# Patient Record
Sex: MALE | Race: OTHER | NOT HISPANIC OR LATINO | URBAN - METROPOLITAN AREA
[De-identification: names, ages, dates, MRNs, and addresses within clinical notes are randomized per-mention and may not be internally consistent; named-entity substitution may affect disease eponyms.]

---

## 2019-09-15 ENCOUNTER — INPATIENT (INPATIENT)
Facility: HOSPITAL | Age: 74
LOS: 6 days | Discharge: HOME | DRG: 853 | End: 2019-09-22
Attending: SURGERY | Admitting: SURGERY
Payer: MEDICARE

## 2019-09-15 VITALS
HEART RATE: 102 BPM | SYSTOLIC BLOOD PRESSURE: 107 MMHG | DIASTOLIC BLOOD PRESSURE: 64 MMHG | RESPIRATION RATE: 20 BRPM | OXYGEN SATURATION: 98 % | TEMPERATURE: 98 F | WEIGHT: 169.98 LBS

## 2019-09-15 LAB
ALBUMIN SERPL ELPH-MCNC: 2.5 G/DL — LOW (ref 3.4–5)
ALP SERPL-CCNC: 115 U/L — SIGNIFICANT CHANGE UP (ref 40–120)
ALT FLD-CCNC: 84 U/L — HIGH (ref 12–42)
ANION GAP SERPL CALC-SCNC: 14 MMOL/L — SIGNIFICANT CHANGE UP (ref 9–16)
ANION GAP SERPL CALC-SCNC: 7 MMOL/L — LOW (ref 9–16)
APTT BLD: 28 SEC — SIGNIFICANT CHANGE UP (ref 27.5–36.3)
AST SERPL-CCNC: 55 U/L — HIGH (ref 15–37)
BASOPHILS NFR BLD AUTO: 0.5 % — SIGNIFICANT CHANGE UP (ref 0–2)
BILIRUB SERPL-MCNC: 0.6 MG/DL — SIGNIFICANT CHANGE UP (ref 0.2–1.2)
BUN SERPL-MCNC: 40 MG/DL — HIGH (ref 7–23)
BUN SERPL-MCNC: 41 MG/DL — HIGH (ref 7–23)
CALCIUM SERPL-MCNC: 7.8 MG/DL — LOW (ref 8.5–10.5)
CALCIUM SERPL-MCNC: 9.6 MG/DL — SIGNIFICANT CHANGE UP (ref 8.5–10.5)
CHLORIDE SERPL-SCNC: 100 MMOL/L — SIGNIFICANT CHANGE UP (ref 96–108)
CHLORIDE SERPL-SCNC: 108 MMOL/L — SIGNIFICANT CHANGE UP (ref 96–108)
CO2 SERPL-SCNC: 24 MMOL/L — SIGNIFICANT CHANGE UP (ref 22–31)
CO2 SERPL-SCNC: 25 MMOL/L — SIGNIFICANT CHANGE UP (ref 22–31)
CREAT SERPL-MCNC: 1.77 MG/DL — HIGH (ref 0.5–1.3)
CREAT SERPL-MCNC: 1.86 MG/DL — HIGH (ref 0.5–1.3)
EOSINOPHIL NFR BLD AUTO: 0.1 % — SIGNIFICANT CHANGE UP (ref 0–6)
GLUCOSE SERPL-MCNC: 116 MG/DL — HIGH (ref 70–99)
GLUCOSE SERPL-MCNC: 143 MG/DL — HIGH (ref 70–99)
HCT VFR BLD CALC: 44.3 % — SIGNIFICANT CHANGE UP (ref 39–50)
HGB BLD-MCNC: 14.8 G/DL — SIGNIFICANT CHANGE UP (ref 13–17)
IMM GRANULOCYTES NFR BLD AUTO: 1 % — SIGNIFICANT CHANGE UP (ref 0–1.5)
INR BLD: 1.6 — HIGH (ref 0.88–1.16)
LACTATE SERPL-SCNC: 3.7 MMOL/L — HIGH (ref 0.4–2)
LYMPHOCYTES # BLD AUTO: 3.4 % — LOW (ref 13–44)
MCHC RBC-ENTMCNC: 29.1 PG — SIGNIFICANT CHANGE UP (ref 27–34)
MCHC RBC-ENTMCNC: 33.4 G/DL — SIGNIFICANT CHANGE UP (ref 32–36)
MCV RBC AUTO: 87.2 FL — SIGNIFICANT CHANGE UP (ref 80–100)
MONOCYTES NFR BLD AUTO: 5 % — SIGNIFICANT CHANGE UP (ref 2–14)
NEUTROPHILS NFR BLD AUTO: 90 % — HIGH (ref 43–77)
PLATELET # BLD AUTO: 354 K/UL — SIGNIFICANT CHANGE UP (ref 150–400)
POTASSIUM SERPL-MCNC: 4.3 MMOL/L — SIGNIFICANT CHANGE UP (ref 3.5–5.3)
POTASSIUM SERPL-MCNC: 4.9 MMOL/L — SIGNIFICANT CHANGE UP (ref 3.5–5.3)
POTASSIUM SERPL-SCNC: 4.3 MMOL/L — SIGNIFICANT CHANGE UP (ref 3.5–5.3)
POTASSIUM SERPL-SCNC: 4.9 MMOL/L — SIGNIFICANT CHANGE UP (ref 3.5–5.3)
PROT SERPL-MCNC: 7 G/DL — SIGNIFICANT CHANGE UP (ref 6.4–8.2)
PROTHROM AB SERPL-ACNC: 17.9 SEC — HIGH (ref 10–12.9)
RBC # BLD: 5.08 M/UL — SIGNIFICANT CHANGE UP (ref 4.2–5.8)
RBC # FLD: 12.8 % — SIGNIFICANT CHANGE UP (ref 10.3–14.5)
SODIUM SERPL-SCNC: 138 MMOL/L — SIGNIFICANT CHANGE UP (ref 132–145)
SODIUM SERPL-SCNC: 140 MMOL/L — SIGNIFICANT CHANGE UP (ref 132–145)
WBC # BLD: 20 K/UL — HIGH (ref 3.8–10.5)
WBC # FLD AUTO: 20 K/UL — HIGH (ref 3.8–10.5)

## 2019-09-15 PROCEDURE — 99285 EMERGENCY DEPT VISIT HI MDM: CPT

## 2019-09-15 PROCEDURE — 72193 CT PELVIS W/DYE: CPT | Mod: 26

## 2019-09-15 RX ORDER — SODIUM CHLORIDE 9 MG/ML
2400 INJECTION INTRAMUSCULAR; INTRAVENOUS; SUBCUTANEOUS ONCE
Refills: 0 | Status: COMPLETED | OUTPATIENT
Start: 2019-09-15 | End: 2019-09-15

## 2019-09-15 RX ORDER — HYDROMORPHONE HYDROCHLORIDE 2 MG/ML
1 INJECTION INTRAMUSCULAR; INTRAVENOUS; SUBCUTANEOUS ONCE
Refills: 0 | Status: DISCONTINUED | OUTPATIENT
Start: 2019-09-15 | End: 2019-09-15

## 2019-09-15 RX ORDER — MORPHINE SULFATE 50 MG/1
4 CAPSULE, EXTENDED RELEASE ORAL ONCE
Refills: 0 | Status: DISCONTINUED | OUTPATIENT
Start: 2019-09-15 | End: 2019-09-15

## 2019-09-15 RX ADMIN — HYDROMORPHONE HYDROCHLORIDE 1 MILLIGRAM(S): 2 INJECTION INTRAMUSCULAR; INTRAVENOUS; SUBCUTANEOUS at 20:52

## 2019-09-15 RX ADMIN — MORPHINE SULFATE 4 MILLIGRAM(S): 50 CAPSULE, EXTENDED RELEASE ORAL at 19:39

## 2019-09-15 RX ADMIN — Medication 600 MILLIGRAM(S): at 21:41

## 2019-09-15 RX ADMIN — MORPHINE SULFATE 4 MILLIGRAM(S): 50 CAPSULE, EXTENDED RELEASE ORAL at 20:10

## 2019-09-15 RX ADMIN — SODIUM CHLORIDE 2400 MILLILITER(S): 9 INJECTION INTRAMUSCULAR; INTRAVENOUS; SUBCUTANEOUS at 21:41

## 2019-09-15 RX ADMIN — Medication 100 MILLIGRAM(S): at 19:57

## 2019-09-15 RX ADMIN — SODIUM CHLORIDE 2400 MILLILITER(S): 9 INJECTION INTRAMUSCULAR; INTRAVENOUS; SUBCUTANEOUS at 22:22

## 2019-09-15 NOTE — ED ADULT TRIAGE NOTE - CHIEF COMPLAINT QUOTE
c/o pain to anus- states nicky mathew prescribed him bactrim c/o pain to anus- states nicky mathew prescribed him bactrim- reports no improvement with pain

## 2019-09-15 NOTE — ED PROVIDER NOTE - CONSTITUTIONAL, MLM
normal... Well appearing, well nourished, awake, alert, oriented to person, place, time/situation and in no apparent distress. Well appearing, well nourished, and in no apparent distress.

## 2019-09-15 NOTE — ED PROVIDER NOTE - CLINICAL SUMMARY MEDICAL DECISION MAKING FREE TEXT BOX
pt remained stable throughout ed course  pain controlled admit to surgical service for operative intervention of abscess pt remained stable throughout ed course  case discussed with surgeon dr guadarrama - large perirectal abscess and possibility for fornieres gangrene as per preliminary radiology report  pain controlled admit to surgical service for operative intervention of mynor rectal abscess

## 2019-09-15 NOTE — ED ADULT NURSE NOTE - CHPI ED NUR SYMPTOMS NEG
no nausea/no dizziness/no decreased eating/drinking/no vomiting/no fever/no tingling/no weakness/no chills

## 2019-09-15 NOTE — ED ADULT NURSE REASSESSMENT NOTE - NS ED NURSE REASSESS COMMENT FT1
pt placed into a gown redness noted to right buttock, pt became breathless standing and removing clothes, placed back into bed, pt talking in full sentences, denies chf,  given bottle to pass urine as states passes urine frequently a lot at home

## 2019-09-15 NOTE — ED PROVIDER NOTE - GASTROINTESTINAL, MLM
Abdomen soft, non-tender, no guarding. Rectal Exam: Red tender indurated mass on his crease of buttocks on the R side. No discharge, no crepitus, no erythema, no swelling, no tenderness and no ecchymosis in perineal area. Scribe Yandel-Nick Goel present in room. Abdomen soft, non-tender, no guarding. Rectal Exam: large Red tender indurated mass on his crease of buttocks on the Right side. on digital rectal exam no tenderness no masses palpated no gross blood.  No discharge from indurated area , no crepitus, no erythema, no swelling, no tenderness and no ecchymosis in perineal area or scrotal area.  Scribe Yandel-Nick Goel present in room.

## 2019-09-15 NOTE — ED PROVIDER NOTE - OBJECTIVE STATEMENT
Triage Note: c/o pain to anus- states nicky mathew prescribed him bactrim- reports no improvement with pain. Dr. Justin, pt and scribe present in room during examination.   VS: HR: 102, BP: 107/64, Resp: 20, T: 98.2 F, SpO2: 98    73 y/o M with a PMHx of pre-diabetes, HTN, HLD presents to the ED c/o of rectal pain x 5 weeks, worsened within the last 5 days. Pt describes pain in rectal area, non-radiating, worse with movement, with no alleviating factors. Pt reports having an associated intermittent subjective fever during this duration, has taken Motrin and has applied hemorrhoid cream to affected area with no relief of sx. Pt has been in bed secondary to pain and associated sx. Pt was seen at [facility] yesterday, was given Bactrim and Ibuprofen with no relief of sx.  Pt denies any N/V/D, abdominal pain, back pain, urinary sx, cough, CP and SOB. Triage Note: c/o pain to anus- states nicky mathew prescribed him bactrim- reports no improvement with pain. Dr. Justin, pt and scribe present in room during examination.   VS: HR: 102, BP: 107/64, Resp: 20, T: 98.2 F, SpO2: 98    75 y/o M with a PMHx of pre-diabetes, HTN, HLD presents to the ED c/o of rectal pain x 5 weeks, worsened within the last 5 days. Pt describes pain in rectal area, non-radiating, worse with movement, with no alleviating factors. Pt reports having an associated intermittent subjective fever over last 5 days. Pt was seen at [facility] yesterday, was given Bactrim and Ibuprofen with no relief of sx.  Pt denies any N/V/D, abdominal pain, back pain, urinary sx, cough, CP and SOB. no dizziness no lightheadedness no diaphoreisis     pt has receiving anal intercourse with other men - last sexual encounter 5 days ago

## 2019-09-15 NOTE — ED ADULT NURSE NOTE - OBJECTIVE STATEMENT
pt is a 73 y/o male presents to the ED for buttocks pain not improving after PO abx prescribed by PMD. pt is a 73 y/o male presents to the ED for buttocks pain x 5 days not improving after PO abx prescribed by PMD.

## 2019-09-16 DIAGNOSIS — K61.1 RECTAL ABSCESS: ICD-10-CM

## 2019-09-16 LAB
ALBUMIN SERPL ELPH-MCNC: 2.4 G/DL — LOW (ref 3.3–5)
ALP SERPL-CCNC: 75 U/L — SIGNIFICANT CHANGE UP (ref 40–120)
ALT FLD-CCNC: 51 U/L — HIGH (ref 10–45)
ANION GAP SERPL CALC-SCNC: 11 MMOL/L — SIGNIFICANT CHANGE UP (ref 5–17)
APPEARANCE UR: CLEAR — SIGNIFICANT CHANGE UP
APTT BLD: 30 SEC — SIGNIFICANT CHANGE UP (ref 27.5–36.3)
AST SERPL-CCNC: 37 U/L — SIGNIFICANT CHANGE UP (ref 10–40)
BASE EXCESS BLDA CALC-SCNC: -6.9 MMOL/L — LOW (ref -2–3)
BASOPHILS # BLD AUTO: 0 K/UL — SIGNIFICANT CHANGE UP (ref 0–0.2)
BASOPHILS NFR BLD AUTO: 0 % — SIGNIFICANT CHANGE UP (ref 0–2)
BILIRUB SERPL-MCNC: 0.9 MG/DL — SIGNIFICANT CHANGE UP (ref 0.2–1.2)
BILIRUB UR-MCNC: NEGATIVE — SIGNIFICANT CHANGE UP
BLD GP AB SCN SERPL QL: NEGATIVE — SIGNIFICANT CHANGE UP
BLD GP AB SCN SERPL QL: NEGATIVE — SIGNIFICANT CHANGE UP
BUN SERPL-MCNC: 32 MG/DL — HIGH (ref 7–23)
CALCIUM SERPL-MCNC: 7.4 MG/DL — LOW (ref 8.4–10.5)
CHLORIDE SERPL-SCNC: 106 MMOL/L — SIGNIFICANT CHANGE UP (ref 96–108)
CO2 SERPL-SCNC: 18 MMOL/L — LOW (ref 22–31)
COLOR SPEC: YELLOW — SIGNIFICANT CHANGE UP
CREAT SERPL-MCNC: 1.5 MG/DL — HIGH (ref 0.5–1.3)
DIFF PNL FLD: NEGATIVE — SIGNIFICANT CHANGE UP
EOSINOPHIL # BLD AUTO: 0 K/UL — SIGNIFICANT CHANGE UP (ref 0–0.5)
EOSINOPHIL NFR BLD AUTO: 0 % — SIGNIFICANT CHANGE UP (ref 0–6)
GLUCOSE BLDC GLUCOMTR-MCNC: 108 MG/DL — HIGH (ref 70–99)
GLUCOSE BLDC GLUCOMTR-MCNC: 167 MG/DL — HIGH (ref 70–99)
GLUCOSE BLDC GLUCOMTR-MCNC: 201 MG/DL — HIGH (ref 70–99)
GLUCOSE BLDC GLUCOMTR-MCNC: 213 MG/DL — HIGH (ref 70–99)
GLUCOSE SERPL-MCNC: 143 MG/DL — HIGH (ref 70–99)
GLUCOSE UR QL: NEGATIVE — SIGNIFICANT CHANGE UP
HBA1C BLD-MCNC: 5.9 % — HIGH (ref 4–5.6)
HCO3 BLDA-SCNC: 18 MMOL/L — LOW (ref 21–28)
HCT VFR BLD CALC: 38 % — LOW (ref 39–50)
HGB BLD-MCNC: 12.2 G/DL — LOW (ref 13–17)
HIV 1+2 AB+HIV1 P24 AG SERPL QL IA: SIGNIFICANT CHANGE UP
INR BLD: 1.46 — HIGH (ref 0.88–1.16)
KETONES UR-MCNC: NEGATIVE — SIGNIFICANT CHANGE UP
LACTATE SERPL-SCNC: 1.7 MMOL/L — SIGNIFICANT CHANGE UP (ref 0.4–2)
LACTATE SERPL-SCNC: 2.4 MMOL/L — HIGH (ref 0.5–2)
LEUKOCYTE ESTERASE UR-ACNC: NEGATIVE — SIGNIFICANT CHANGE UP
LYMPHOCYTES # BLD AUTO: 0.54 K/UL — LOW (ref 1–3.3)
LYMPHOCYTES # BLD AUTO: 2 % — LOW (ref 13–44)
MAGNESIUM SERPL-MCNC: 1.7 MG/DL — SIGNIFICANT CHANGE UP (ref 1.6–2.6)
MCHC RBC-ENTMCNC: 29.1 PG — SIGNIFICANT CHANGE UP (ref 27–34)
MCHC RBC-ENTMCNC: 32.1 GM/DL — SIGNIFICANT CHANGE UP (ref 32–36)
MCV RBC AUTO: 90.7 FL — SIGNIFICANT CHANGE UP (ref 80–100)
MONOCYTES # BLD AUTO: 1.09 K/UL — HIGH (ref 0–0.9)
MONOCYTES NFR BLD AUTO: 4 % — SIGNIFICANT CHANGE UP (ref 2–14)
NEUTROPHILS # BLD AUTO: 25.26 K/UL — HIGH (ref 1.8–7.4)
NEUTROPHILS NFR BLD AUTO: 88 % — HIGH (ref 43–77)
NITRITE UR-MCNC: NEGATIVE — SIGNIFICANT CHANGE UP
NRBC # BLD: SIGNIFICANT CHANGE UP /100 WBCS (ref 0–0)
PCO2 BLDA: 33 MMHG — LOW (ref 35–48)
PH BLDA: 7.35 — SIGNIFICANT CHANGE UP (ref 7.35–7.45)
PH UR: 5.5 — SIGNIFICANT CHANGE UP (ref 5–8)
PHOSPHATE SERPL-MCNC: 3.9 MG/DL — SIGNIFICANT CHANGE UP (ref 2.5–4.5)
PLATELET # BLD AUTO: 336 K/UL — SIGNIFICANT CHANGE UP (ref 150–400)
PO2 BLDA: 172 MMHG — HIGH (ref 83–108)
POTASSIUM SERPL-MCNC: 4.9 MMOL/L — SIGNIFICANT CHANGE UP (ref 3.5–5.3)
POTASSIUM SERPL-SCNC: 4.9 MMOL/L — SIGNIFICANT CHANGE UP (ref 3.5–5.3)
PROT SERPL-MCNC: 5 G/DL — LOW (ref 6–8.3)
PROT UR-MCNC: NEGATIVE MG/DL — SIGNIFICANT CHANGE UP
PROTHROM AB SERPL-ACNC: 16.7 SEC — HIGH (ref 10–12.9)
RBC # BLD: 4.19 M/UL — LOW (ref 4.2–5.8)
RBC # FLD: 12.9 % — SIGNIFICANT CHANGE UP (ref 10.3–14.5)
RH IG SCN BLD-IMP: POSITIVE — SIGNIFICANT CHANGE UP
RH IG SCN BLD-IMP: POSITIVE — SIGNIFICANT CHANGE UP
SAO2 % BLDA: 99 % — SIGNIFICANT CHANGE UP (ref 95–100)
SODIUM SERPL-SCNC: 135 MMOL/L — SIGNIFICANT CHANGE UP (ref 135–145)
SP GR SPEC: <=1.005 — SIGNIFICANT CHANGE UP (ref 1–1.03)
T PALLIDUM AB TITR SER: NEGATIVE — SIGNIFICANT CHANGE UP
UROBILINOGEN FLD QL: 0.2 E.U./DL — SIGNIFICANT CHANGE UP
WBC # BLD: 27.16 K/UL — HIGH (ref 3.8–10.5)
WBC # FLD AUTO: 27.16 K/UL — HIGH (ref 3.8–10.5)

## 2019-09-16 PROCEDURE — 71045 X-RAY EXAM CHEST 1 VIEW: CPT | Mod: 26

## 2019-09-16 PROCEDURE — 99232 SBSQ HOSP IP/OBS MODERATE 35: CPT | Mod: 57

## 2019-09-16 PROCEDURE — 46040 I&D ISCHIORCT&/PERIRCT ABSC: CPT

## 2019-09-16 PROCEDURE — 99223 1ST HOSP IP/OBS HIGH 75: CPT | Mod: GC

## 2019-09-16 RX ORDER — NOREPINEPHRINE BITARTRATE/D5W 8 MG/250ML
0.05 PLASTIC BAG, INJECTION (ML) INTRAVENOUS
Qty: 8 | Refills: 0 | Status: DISCONTINUED | OUTPATIENT
Start: 2019-09-16 | End: 2019-09-17

## 2019-09-16 RX ORDER — INSULIN LISPRO 100/ML
VIAL (ML) SUBCUTANEOUS EVERY 6 HOURS
Refills: 0 | Status: DISCONTINUED | OUTPATIENT
Start: 2019-09-16 | End: 2019-09-20

## 2019-09-16 RX ORDER — AZELASTINE 137 UG/1
2 SPRAY, METERED NASAL
Qty: 0 | Refills: 0 | DISCHARGE

## 2019-09-16 RX ORDER — OMEGA-3 ACID ETHYL ESTERS 1 G
2 CAPSULE ORAL
Qty: 0 | Refills: 0 | DISCHARGE

## 2019-09-16 RX ORDER — SODIUM CHLORIDE 9 MG/ML
1000 INJECTION, SOLUTION INTRAVENOUS
Refills: 0 | Status: DISCONTINUED | OUTPATIENT
Start: 2019-09-16 | End: 2019-09-16

## 2019-09-16 RX ORDER — MAGNESIUM SULFATE 500 MG/ML
2 VIAL (ML) INJECTION ONCE
Refills: 0 | Status: COMPLETED | OUTPATIENT
Start: 2019-09-16 | End: 2019-09-16

## 2019-09-16 RX ORDER — SODIUM CHLORIDE 9 MG/ML
1000 INJECTION, SOLUTION INTRAVENOUS ONCE
Refills: 0 | Status: COMPLETED | OUTPATIENT
Start: 2019-09-16 | End: 2019-09-16

## 2019-09-16 RX ORDER — INSULIN LISPRO 100/ML
VIAL (ML) SUBCUTANEOUS
Refills: 0 | Status: DISCONTINUED | OUTPATIENT
Start: 2019-09-16 | End: 2019-09-16

## 2019-09-16 RX ORDER — VANCOMYCIN HCL 1 G
1000 VIAL (EA) INTRAVENOUS EVERY 24 HOURS
Refills: 0 | Status: CANCELLED | OUTPATIENT
Start: 2019-09-17 | End: 2019-09-16

## 2019-09-16 RX ORDER — ENOXAPARIN SODIUM 100 MG/ML
40 INJECTION SUBCUTANEOUS EVERY 24 HOURS
Refills: 0 | Status: DISCONTINUED | OUTPATIENT
Start: 2019-09-16 | End: 2019-09-16

## 2019-09-16 RX ORDER — SODIUM CHLORIDE 9 MG/ML
1000 INJECTION INTRAMUSCULAR; INTRAVENOUS; SUBCUTANEOUS ONCE
Refills: 0 | Status: COMPLETED | OUTPATIENT
Start: 2019-09-16 | End: 2019-09-16

## 2019-09-16 RX ORDER — PIPERACILLIN AND TAZOBACTAM 4; .5 G/20ML; G/20ML
3.38 INJECTION, POWDER, LYOPHILIZED, FOR SOLUTION INTRAVENOUS EVERY 6 HOURS
Refills: 0 | Status: DISCONTINUED | OUTPATIENT
Start: 2019-09-16 | End: 2019-09-18

## 2019-09-16 RX ORDER — PROPOFOL 10 MG/ML
5 INJECTION, EMULSION INTRAVENOUS
Qty: 1000 | Refills: 0 | Status: DISCONTINUED | OUTPATIENT
Start: 2019-09-16 | End: 2019-09-17

## 2019-09-16 RX ORDER — VANCOMYCIN HCL 1 G
VIAL (EA) INTRAVENOUS
Refills: 0 | Status: DISCONTINUED | OUTPATIENT
Start: 2019-09-16 | End: 2019-09-16

## 2019-09-16 RX ORDER — VANCOMYCIN HCL 1 G
1250 VIAL (EA) INTRAVENOUS EVERY 24 HOURS
Refills: 0 | Status: DISCONTINUED | OUTPATIENT
Start: 2019-09-17 | End: 2019-09-17

## 2019-09-16 RX ORDER — CHLORHEXIDINE GLUCONATE 213 G/1000ML
1 SOLUTION TOPICAL
Refills: 0 | Status: DISCONTINUED | OUTPATIENT
Start: 2019-09-16 | End: 2019-09-20

## 2019-09-16 RX ORDER — VANCOMYCIN HCL 1 G
1000 VIAL (EA) INTRAVENOUS ONCE
Refills: 0 | Status: COMPLETED | OUTPATIENT
Start: 2019-09-16 | End: 2019-09-16

## 2019-09-16 RX ORDER — CHLORHEXIDINE GLUCONATE 213 G/1000ML
15 SOLUTION TOPICAL
Refills: 0 | Status: DISCONTINUED | OUTPATIENT
Start: 2019-09-16 | End: 2019-09-17

## 2019-09-16 RX ORDER — PANTOPRAZOLE SODIUM 20 MG/1
40 TABLET, DELAYED RELEASE ORAL DAILY
Refills: 0 | Status: DISCONTINUED | OUTPATIENT
Start: 2019-09-16 | End: 2019-09-17

## 2019-09-16 RX ORDER — HYDROMORPHONE HYDROCHLORIDE 2 MG/ML
1 INJECTION INTRAMUSCULAR; INTRAVENOUS; SUBCUTANEOUS ONCE
Refills: 0 | Status: DISCONTINUED | OUTPATIENT
Start: 2019-09-16 | End: 2019-09-16

## 2019-09-16 RX ORDER — VANCOMYCIN HCL 1 G
750 VIAL (EA) INTRAVENOUS EVERY 24 HOURS
Refills: 0 | Status: DISCONTINUED | OUTPATIENT
Start: 2019-09-16 | End: 2019-09-16

## 2019-09-16 RX ORDER — PIPERACILLIN AND TAZOBACTAM 4; .5 G/20ML; G/20ML
3.38 INJECTION, POWDER, LYOPHILIZED, FOR SOLUTION INTRAVENOUS ONCE
Refills: 0 | Status: DISCONTINUED | OUTPATIENT
Start: 2019-09-16 | End: 2019-09-16

## 2019-09-16 RX ORDER — HYDROMORPHONE HYDROCHLORIDE 2 MG/ML
0.5 INJECTION INTRAMUSCULAR; INTRAVENOUS; SUBCUTANEOUS EVERY 4 HOURS
Refills: 0 | Status: DISCONTINUED | OUTPATIENT
Start: 2019-09-16 | End: 2019-09-16

## 2019-09-16 RX ORDER — DEXTROSE 50 % IN WATER 50 %
15 SYRINGE (ML) INTRAVENOUS ONCE
Refills: 0 | Status: DISCONTINUED | OUTPATIENT
Start: 2019-09-16 | End: 2019-09-16

## 2019-09-16 RX ORDER — FENOFIBRATE,MICRONIZED 130 MG
1 CAPSULE ORAL
Qty: 0 | Refills: 0 | DISCHARGE

## 2019-09-16 RX ORDER — PIPERACILLIN AND TAZOBACTAM 4; .5 G/20ML; G/20ML
3.38 INJECTION, POWDER, LYOPHILIZED, FOR SOLUTION INTRAVENOUS EVERY 6 HOURS
Refills: 0 | Status: DISCONTINUED | OUTPATIENT
Start: 2019-09-16 | End: 2019-09-16

## 2019-09-16 RX ORDER — VANCOMYCIN HCL 1 G
750 VIAL (EA) INTRAVENOUS EVERY 12 HOURS
Refills: 0 | Status: DISCONTINUED | OUTPATIENT
Start: 2019-09-16 | End: 2019-09-16

## 2019-09-16 RX ORDER — DEXTROSE 50 % IN WATER 50 %
12.5 SYRINGE (ML) INTRAVENOUS ONCE
Refills: 0 | Status: DISCONTINUED | OUTPATIENT
Start: 2019-09-16 | End: 2019-09-16

## 2019-09-16 RX ORDER — ONDANSETRON 8 MG/1
4 TABLET, FILM COATED ORAL EVERY 6 HOURS
Refills: 0 | Status: DISCONTINUED | OUTPATIENT
Start: 2019-09-16 | End: 2019-09-16

## 2019-09-16 RX ORDER — FENTANYL CITRATE 50 UG/ML
0.5 INJECTION INTRAVENOUS
Qty: 2500 | Refills: 0 | Status: DISCONTINUED | OUTPATIENT
Start: 2019-09-16 | End: 2019-09-17

## 2019-09-16 RX ORDER — CHOLECALCIFEROL (VITAMIN D3) 125 MCG
1 CAPSULE ORAL
Qty: 0 | Refills: 0 | DISCHARGE

## 2019-09-16 RX ORDER — DEXTROSE 50 % IN WATER 50 %
25 SYRINGE (ML) INTRAVENOUS ONCE
Refills: 0 | Status: DISCONTINUED | OUTPATIENT
Start: 2019-09-16 | End: 2019-09-16

## 2019-09-16 RX ORDER — PIPERACILLIN AND TAZOBACTAM 4; .5 G/20ML; G/20ML
3.38 INJECTION, POWDER, LYOPHILIZED, FOR SOLUTION INTRAVENOUS ONCE
Refills: 0 | Status: COMPLETED | OUTPATIENT
Start: 2019-09-16 | End: 2019-09-16

## 2019-09-16 RX ORDER — HYDROMORPHONE HYDROCHLORIDE 2 MG/ML
1 INJECTION INTRAMUSCULAR; INTRAVENOUS; SUBCUTANEOUS EVERY 4 HOURS
Refills: 0 | Status: DISCONTINUED | OUTPATIENT
Start: 2019-09-16 | End: 2019-09-16

## 2019-09-16 RX ORDER — SODIUM CHLORIDE 9 MG/ML
1000 INJECTION, SOLUTION INTRAVENOUS
Refills: 0 | Status: DISCONTINUED | OUTPATIENT
Start: 2019-09-16 | End: 2019-09-17

## 2019-09-16 RX ORDER — GLUCAGON INJECTION, SOLUTION 0.5 MG/.1ML
1 INJECTION, SOLUTION SUBCUTANEOUS ONCE
Refills: 0 | Status: DISCONTINUED | OUTPATIENT
Start: 2019-09-16 | End: 2019-09-16

## 2019-09-16 RX ORDER — LOSARTAN POTASSIUM 100 MG/1
1 TABLET, FILM COATED ORAL
Qty: 0 | Refills: 0 | DISCHARGE

## 2019-09-16 RX ORDER — ENOXAPARIN SODIUM 100 MG/ML
40 INJECTION SUBCUTANEOUS EVERY 24 HOURS
Refills: 0 | Status: DISCONTINUED | OUTPATIENT
Start: 2019-09-16 | End: 2019-09-22

## 2019-09-16 RX ADMIN — SODIUM CHLORIDE 500 MILLILITER(S): 9 INJECTION INTRAMUSCULAR; INTRAVENOUS; SUBCUTANEOUS at 00:48

## 2019-09-16 RX ADMIN — Medication 100 MILLIGRAM(S): at 16:51

## 2019-09-16 RX ADMIN — Medication 2: at 12:07

## 2019-09-16 RX ADMIN — PROPOFOL 2.31 MICROGRAM(S)/KG/MIN: 10 INJECTION, EMULSION INTRAVENOUS at 10:00

## 2019-09-16 RX ADMIN — PIPERACILLIN AND TAZOBACTAM 200 GRAM(S): 4; .5 INJECTION, POWDER, LYOPHILIZED, FOR SOLUTION INTRAVENOUS at 00:33

## 2019-09-16 RX ADMIN — HYDROMORPHONE HYDROCHLORIDE 1 MILLIGRAM(S): 2 INJECTION INTRAMUSCULAR; INTRAVENOUS; SUBCUTANEOUS at 00:33

## 2019-09-16 RX ADMIN — Medication 4: at 17:00

## 2019-09-16 RX ADMIN — Medication 50 GRAM(S): at 12:07

## 2019-09-16 RX ADMIN — Medication 7.23 MICROGRAM(S)/KG/MIN: at 21:58

## 2019-09-16 RX ADMIN — Medication 250 MILLIGRAM(S): at 04:18

## 2019-09-16 RX ADMIN — SODIUM CHLORIDE 6000 MILLILITER(S): 9 INJECTION, SOLUTION INTRAVENOUS at 04:00

## 2019-09-16 RX ADMIN — Medication 100 MILLIGRAM(S): at 23:55

## 2019-09-16 RX ADMIN — PIPERACILLIN AND TAZOBACTAM 200 GRAM(S): 4; .5 INJECTION, POWDER, LYOPHILIZED, FOR SOLUTION INTRAVENOUS at 12:06

## 2019-09-16 RX ADMIN — SODIUM CHLORIDE 120 MILLILITER(S): 9 INJECTION, SOLUTION INTRAVENOUS at 02:45

## 2019-09-16 RX ADMIN — PIPERACILLIN AND TAZOBACTAM 200 GRAM(S): 4; .5 INJECTION, POWDER, LYOPHILIZED, FOR SOLUTION INTRAVENOUS at 23:22

## 2019-09-16 RX ADMIN — SODIUM CHLORIDE 120 MILLILITER(S): 9 INJECTION, SOLUTION INTRAVENOUS at 10:00

## 2019-09-16 RX ADMIN — HYDROMORPHONE HYDROCHLORIDE 0.5 MILLIGRAM(S): 2 INJECTION INTRAMUSCULAR; INTRAVENOUS; SUBCUTANEOUS at 04:55

## 2019-09-16 RX ADMIN — PANTOPRAZOLE SODIUM 40 MILLIGRAM(S): 20 TABLET, DELAYED RELEASE ORAL at 12:07

## 2019-09-16 RX ADMIN — Medication 7.23 MICROGRAM(S)/KG/MIN: at 10:00

## 2019-09-16 RX ADMIN — CHLORHEXIDINE GLUCONATE 15 MILLILITER(S): 213 SOLUTION TOPICAL at 17:00

## 2019-09-16 RX ADMIN — Medication 4: at 23:20

## 2019-09-16 RX ADMIN — HYDROMORPHONE HYDROCHLORIDE 0.5 MILLIGRAM(S): 2 INJECTION INTRAMUSCULAR; INTRAVENOUS; SUBCUTANEOUS at 05:30

## 2019-09-16 RX ADMIN — ENOXAPARIN SODIUM 40 MILLIGRAM(S): 100 INJECTION SUBCUTANEOUS at 12:07

## 2019-09-16 RX ADMIN — PIPERACILLIN AND TAZOBACTAM 200 GRAM(S): 4; .5 INJECTION, POWDER, LYOPHILIZED, FOR SOLUTION INTRAVENOUS at 06:02

## 2019-09-16 RX ADMIN — FENTANYL CITRATE 3.85 MICROGRAM(S)/KG/HR: 50 INJECTION INTRAVENOUS at 12:08

## 2019-09-16 RX ADMIN — PIPERACILLIN AND TAZOBACTAM 200 GRAM(S): 4; .5 INJECTION, POWDER, LYOPHILIZED, FOR SOLUTION INTRAVENOUS at 17:00

## 2019-09-16 NOTE — H&P ADULT - ASSESSMENT
This is a 75 y/o male w/ anal pain that began 5 weeks ago and has acutely worsened over the past 5 days, with associated subjective fevers and chills, an erythematous and fluctuant mass to the right of the anus, an elevated WBC, and CT showing a perianal abscess that extends into the buttocks and right leg.  Transferred from [facility] ED to [facility] for further management.     OR for I&D  Zosyn & Vancomycin   NPO/ IVF  Pain and Nausea control  OOB/SCD/ISS/ Lovenox 40  Home meds

## 2019-09-16 NOTE — BRIEF OPERATIVE NOTE - OPERATION/FINDINGS
Placed pt in dorsal lithotomy position. Cruciate incision made in right buttock. Entered ischiorectal space, and ischiorectal abscess drained out 100 cc of pus. Tract identified to abscess from posterior anal canal. Pulse irrigated the abscess space. Wound packed with betadine-soaked kerlix, covered with ABD pads.

## 2019-09-16 NOTE — CONSULT NOTE ADULT - ATTENDING COMMENTS
Patient seen and examined with house-staff during bedside rounds.  Resident note read, including vitals, physical findings, laboratory data, and radiological reports.   Revisions included below.  Direct personal management at bed side and extensive interpretation of the data.  Plan was outlined and discussed in details with the housestaff.  Decision making of high complexity  Action taken for acute disease activity to reflect the level of care provided:  - medication reconciliation  - review laboratory data  Continue MV  continue sedation  follow cultures  On antibiotics  Control BS and maintain BS in the range 140-180  moniotr for hypoglycemia

## 2019-09-16 NOTE — H&P ADULT - ATTENDING COMMENTS
Patient seen and examined. Suffereing from sepsis secondary to perirectal abscess and soft tissue infection +/- gangrenous changes. He requires exploration and possible debridement. We discussed the risks, benefits and alternatives to surgery including disability, need for additional procedures, death, and other issues. I answered all questions.

## 2019-09-16 NOTE — CONSULT NOTE ADULT - PROBLEM SELECTOR RECOMMENDATION 9
-continue IV antibx  -added on to OR for I&D perirectal abscess -continue IV antibx  -added on to OR for I&D perirectal abscess  -place herrmann  -will be able if needed in OR if abscess extending to scrotum/testes  -continue present care per surgery  -f/u cultures -continue IV antibx  -added on to OR for I&D perirectal abscess  -place herrmann  -will be available if needed in OR if abscess extending to scrotum/testes  -continue present care per surgery  -f/u cultures

## 2019-09-16 NOTE — H&P ADULT - NSHPPHYSICALEXAM_GEN_ALL_CORE
Vital Signs Last 24 Hrs  T(C): 37.2 (16 Sep 2019 01:00), Max: 37.2 (16 Sep 2019 01:00)  T(F): 99 (16 Sep 2019 01:00), Max: 99 (16 Sep 2019 01:00)  HR: 93 (16 Sep 2019 01:00) (89 - 102)  BP: 107/73 (16 Sep 2019 01:00) (105/72 - 107/73)  BP(mean): --  RR: 20 (16 Sep 2019 01:00) (20 - 22)  SpO2: 97% (16 Sep 2019 01:00) (95% - 98%)      Physical Exam:  General Appearance: Warm and diaphoretic, NAD.   HEENT: Grossly symmetric  Chest: Mildly labored breathing on NC  CV: Pulse regular presently  Abdomen: Soft, nontender, moderately distended and tympanic to percussion  Extremities: There is an erythematous, tender, induration,  extending from the anus to the right gluteus, approximately 10-15cm in diameter, which has been traced with a marker.

## 2019-09-16 NOTE — CONSULT NOTE ADULT - ASSESSMENT
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     NEURO: propofol/fentanyl gtts while intubated.   CV: septic shock, on levophed.   PULM: intubated on AC.   GI/FEN: NPO, IVF.   : BPH, 18F coude herrmann placed in OR by urology. monitor strict I&O  ENDO: ISS. was on metformin out patient. hold off oral meds, monitor fingerstick.    ID: perirectal abscess: vanc/zosyn/clinda (9/16--). cx sent from OR, f/u result and adjust abx as needed. will also check HIV, chlamydia/gonorrhea, syphillis, HIV due to recent intercourse with stranger with .   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     NEURO: propofol/fentanyl gtts while intubated.   CV: septic shock, on levophed.   PULM: intubated on AC.   GI/FEN: NPO, IVF.   : BPH, 18F coude herrmann placed in OR by urology. monitor strict I&O  ENDO: ISS. was on metformin out patient. hold off oral meds, monitor fingerstick.    ID: perirectal abscess: vanc/zosyn/clinda (9/16--). cx sent from OR, f/u result and adjust abx as needed. will also check HIV, chlamydia/gonorrhea, syphillis, due to recent intercourse with stranger with questionable protection.   PPX: SCD, SQL  LINES: [facility] Andrea (9/16--)   WOUNDS/DRAINS: perirectal wound packed with betadine soaked kerlix.

## 2019-09-16 NOTE — CONSULT NOTE ADULT - ASSESSMENT
73 yo male with perirectal abscess r/o Fourniers gangrene 73 yo male with R perirectal/perianal abscess r/o Fourniers gangrene

## 2019-09-16 NOTE — CONSULT NOTE ADULT - SUBJECTIVE AND OBJECTIVE BOX
HPI: 73 yo male with h/o pre dm, htn, hld, dm, presented to ED earlier c/o rectal pain x 5 weeks which worsened in last 5 days with subjective fevers. Voiding ok. Was seen by MD yesterday and prescribed bactrim.   CT pelvis c/w < from: CT Pelvis w/ IV Cont (09.15.19 @ 23:29) >  Large gas and fluid containing perianal abscess centered in the right   perineum with fistulous connection to the anus posteriorly and   incompletely visualized extension into the inferomedial right buttock.   This is highly suspicious for Vida's gangrene.  Called to evaluate for possible extension abscess to scrotum.         PAST MEDICAL & SURGICAL HISTORY:  HLD (hyperlipidemia)  HTN (hypertension)  No significant past surgical history      MEDICATIONS  (STANDING):  dextrose 5%. 1000 milliLiter(s) (50 mL/Hr) IV Continuous <Continuous>  dextrose 50% Injectable 12.5 Gram(s) IV Push once  dextrose 50% Injectable 25 Gram(s) IV Push once  dextrose 50% Injectable 25 Gram(s) IV Push once  enoxaparin Injectable 40 milliGRAM(s) SubCutaneous every 24 hours  insulin lispro (HumaLOG) corrective regimen sliding scale   SubCutaneous Before meals and at bedtime  lactated ringers Bolus 1000 milliLiter(s) IV Bolus once  lactated ringers. 1000 milliLiter(s) (120 mL/Hr) IV Continuous <Continuous>  piperacillin/tazobactam IVPB.. 3.375 Gram(s) IV Intermittent every 6 hours  vancomycin  IVPB 750 milliGRAM(s) IV Intermittent every 24 hours    MEDICATIONS  (PRN):  dextrose 40% Gel 15 Gram(s) Oral once PRN Blood Glucose LESS THAN 70 milliGRAM(s)/deciliter  glucagon  Injectable 1 milliGRAM(s) IntraMuscular once PRN Glucose LESS THAN 70 milligrams/deciliter  HYDROmorphone  Injectable 0.5 milliGRAM(s) IV Push every 4 hours PRN Moderate Pain (4 - 6)  HYDROmorphone  Injectable 1 milliGRAM(s) IV Push every 4 hours PRN Severe Pain (7 - 10)  ondansetron Injectable 4 milliGRAM(s) IV Push every 6 hours PRN Nausea      Allergies    No Known Allergies    Intolerances        SOCIAL HISTORY:    FAMILY HISTORY:      Vital Signs Last 24 Hrs  T(C): 37 (16 Sep 2019 03:37), Max: 37.2 (16 Sep 2019 01:00)  T(F): 98.6 (16 Sep 2019 03:37), Max: 99 (16 Sep 2019 01:00)  HR: 99 (16 Sep 2019 03:37) (89 - 102)  BP: 94/46 (16 Sep 2019 03:37) (94/46 - 107/73)  BP(mean): 66 (16 Sep 2019 02:09) (66 - 66)  RR: 18 (16 Sep 2019 03:37) (16 - 22)  SpO2: 100% (16 Sep 2019 03:37) (95% - 100%)    On PE:  General: alert and awake  Abdomen: soft, NT, ND  :    LABS:                        14.8   20.0  )-----------( 354      ( 15 Sep 2019 19:39 )             44.3     09-15    140  |  108  |  41<H>  ----------------------------<  116<H>  4.3   |  25  |  1.77<H>    Ca    7.8<L>      15 Sep 2019 22:05    TPro  7.0  /  Alb  2.5<L>  /  TBili  0.6  /  DBili  x   /  AST  55<H>  /  ALT  84<H>  /  AlkPhos  115  09-15    PT/INR - ( 15 Sep 2019 20:53 )   PT: 17.9 sec;   INR: 1.60          PTT - ( 15 Sep 2019 20:53 )  PTT:28.0 sec      RADIOLOGY & ADDITIONAL STUDIES: CT pelvis as above HPI: 75 yo male with h/o pre dm, htn, hld, dm, presented to ED earlier c/o rectal pain x 5 weeks which worsened in last 5 days with subjective fevers. Voiding ok. Was seen by MD yesterday and prescribed bactrim.   CT pelvis c/w < from: CT Pelvis w/ IV Cont (09.15.19 @ 23:29) >  Large gas and fluid containing perianal abscess centered in the right   perineum with fistulous connection to the anus posteriorly and   incompletely visualized extension into the inferomedial right buttock.   This is highly suspicious for Vida's gangrene.  Called to evaluate for possible extension abscess to scrotum.  Patient has h/o BPH- no medication. +symptoms of urinary frequency ,urgency, hesitancy, and nocturia. No dysuria/hematuria.   Patient scheduled for I&D of perianal abscess.         PAST MEDICAL & SURGICAL HISTORY:  HLD (hyperlipidemia)  HTN (hypertension)  No significant past surgical history      MEDICATIONS  (STANDING):  dextrose 5%. 1000 milliLiter(s) (50 mL/Hr) IV Continuous <Continuous>  dextrose 50% Injectable 12.5 Gram(s) IV Push once  dextrose 50% Injectable 25 Gram(s) IV Push once  dextrose 50% Injectable 25 Gram(s) IV Push once  enoxaparin Injectable 40 milliGRAM(s) SubCutaneous every 24 hours  insulin lispro (HumaLOG) corrective regimen sliding scale   SubCutaneous Before meals and at bedtime  lactated ringers Bolus 1000 milliLiter(s) IV Bolus once  lactated ringers. 1000 milliLiter(s) (120 mL/Hr) IV Continuous <Continuous>  piperacillin/tazobactam IVPB.. 3.375 Gram(s) IV Intermittent every 6 hours  vancomycin  IVPB 750 milliGRAM(s) IV Intermittent every 24 hours    MEDICATIONS  (PRN):  dextrose 40% Gel 15 Gram(s) Oral once PRN Blood Glucose LESS THAN 70 milliGRAM(s)/deciliter  glucagon  Injectable 1 milliGRAM(s) IntraMuscular once PRN Glucose LESS THAN 70 milligrams/deciliter  HYDROmorphone  Injectable 0.5 milliGRAM(s) IV Push every 4 hours PRN Moderate Pain (4 - 6)  HYDROmorphone  Injectable 1 milliGRAM(s) IV Push every 4 hours PRN Severe Pain (7 - 10)  ondansetron Injectable 4 milliGRAM(s) IV Push every 6 hours PRN Nausea      Allergies    No Known Allergies    Intolerances        SOCIAL HISTORY:    FAMILY HISTORY:      Vital Signs Last 24 Hrs  T(C): 37 (16 Sep 2019 03:37), Max: 37.2 (16 Sep 2019 01:00)  T(F): 98.6 (16 Sep 2019 03:37), Max: 99 (16 Sep 2019 01:00)  HR: 99 (16 Sep 2019 03:37) (89 - 102)  BP: 94/46 (16 Sep 2019 03:37) (94/46 - 107/73)  BP(mean): 66 (16 Sep 2019 02:09) (66 - 66)  RR: 18 (16 Sep 2019 03:37) (16 - 22)  SpO2: 100% (16 Sep 2019 03:37) (95% - 100%)    On PE:  General: alert and awake  Abdomen: soft, NT, ND  : circumcised male, bilat testes descended, NT. Mild erythema skin posterior scrotum, NT.   +large R perianal/R perineum +tender to touch.     LABS:                        14.8   20.0  )-----------( 354      ( 15 Sep 2019 19:39 )             44.3     09-15    140  |  108  |  41<H>  ----------------------------<  116<H>  4.3   |  25  |  1.77<H>    Ca    7.8<L>      15 Sep 2019 22:05    TPro  7.0  /  Alb  2.5<L>  /  TBili  0.6  /  DBili  x   /  AST  55<H>  /  ALT  84<H>  /  AlkPhos  115  09-15    PT/INR - ( 15 Sep 2019 20:53 )   PT: 17.9 sec;   INR: 1.60          PTT - ( 15 Sep 2019 20:53 )  PTT:28.0 sec      RADIOLOGY & ADDITIONAL STUDIES: CT pelvis as above HPI: 73 yo male with h/o pre dm, htn, hld, dm, presented to ED earlier c/o rectal pain x 5 weeks which worsened in last 5 days with subjective fevers. Voiding ok. Was seen by MD yesterday and prescribed bactrim.   CT pelvis c/w < from: CT Pelvis w/ IV Cont (09.15.19 @ 23:29) >  Large gas and fluid containing perianal abscess centered in the right   perineum with fistulous connection to the anus posteriorly and   incompletely visualized extension into the inferomedial right buttock.   This is highly suspicious for Vida's gangrene.  Called to evaluate for possible extension abscess to scrotum.  Patient has h/o BPH- no medication. +symptoms of urinary frequency ,urgency, hesitancy, and nocturia. No dysuria/hematuria. Patient has not been sexually active in a while with his partner and had random one night anal sex about 4 weeks ago with condom. No h/o STD's.   Patient scheduled for I&D of perianal abscess.         PAST MEDICAL & SURGICAL HISTORY:  HLD (hyperlipidemia)  HTN (hypertension)  No significant past surgical history      MEDICATIONS  (STANDING):  dextrose 5%. 1000 milliLiter(s) (50 mL/Hr) IV Continuous <Continuous>  dextrose 50% Injectable 12.5 Gram(s) IV Push once  dextrose 50% Injectable 25 Gram(s) IV Push once  dextrose 50% Injectable 25 Gram(s) IV Push once  enoxaparin Injectable 40 milliGRAM(s) SubCutaneous every 24 hours  insulin lispro (HumaLOG) corrective regimen sliding scale   SubCutaneous Before meals and at bedtime  lactated ringers Bolus 1000 milliLiter(s) IV Bolus once  lactated ringers. 1000 milliLiter(s) (120 mL/Hr) IV Continuous <Continuous>  piperacillin/tazobactam IVPB.. 3.375 Gram(s) IV Intermittent every 6 hours  vancomycin  IVPB 750 milliGRAM(s) IV Intermittent every 24 hours    MEDICATIONS  (PRN):  dextrose 40% Gel 15 Gram(s) Oral once PRN Blood Glucose LESS THAN 70 milliGRAM(s)/deciliter  glucagon  Injectable 1 milliGRAM(s) IntraMuscular once PRN Glucose LESS THAN 70 milligrams/deciliter  HYDROmorphone  Injectable 0.5 milliGRAM(s) IV Push every 4 hours PRN Moderate Pain (4 - 6)  HYDROmorphone  Injectable 1 milliGRAM(s) IV Push every 4 hours PRN Severe Pain (7 - 10)  ondansetron Injectable 4 milliGRAM(s) IV Push every 6 hours PRN Nausea      Allergies    No Known Allergies    Intolerances        SOCIAL HISTORY:    FAMILY HISTORY:      Vital Signs Last 24 Hrs  T(C): 37 (16 Sep 2019 03:37), Max: 37.2 (16 Sep 2019 01:00)  T(F): 98.6 (16 Sep 2019 03:37), Max: 99 (16 Sep 2019 01:00)  HR: 99 (16 Sep 2019 03:37) (89 - 102)  BP: 94/46 (16 Sep 2019 03:37) (94/46 - 107/73)  BP(mean): 66 (16 Sep 2019 02:09) (66 - 66)  RR: 18 (16 Sep 2019 03:37) (16 - 22)  SpO2: 100% (16 Sep 2019 03:37) (95% - 100%)    On PE:  General: alert and awake  Abdomen: soft, NT, ND  : circumcised male, bilat testes descended, NT. Mild erythema skin posterior scrotum, NT.   +large R perianal/R perineum +tender to touch.     LABS:                        14.8   20.0  )-----------( 354      ( 15 Sep 2019 19:39 )             44.3     09-15    140  |  108  |  41<H>  ----------------------------<  116<H>  4.3   |  25  |  1.77<H>    Ca    7.8<L>      15 Sep 2019 22:05    TPro  7.0  /  Alb  2.5<L>  /  TBili  0.6  /  DBili  x   /  AST  55<H>  /  ALT  84<H>  /  AlkPhos  115  09-15    PT/INR - ( 15 Sep 2019 20:53 )   PT: 17.9 sec;   INR: 1.60          PTT - ( 15 Sep 2019 20:53 )  PTT:28.0 sec      RADIOLOGY & ADDITIONAL STUDIES: CT pelvis as above HPI: 73 yo male with h/o pre dm, htn, hld, dm, presented to ED earlier c/o rectal pain x 5 weeks which worsened in last 5 days with subjective fevers. Voiding ok. Was seen by MD yesterday and prescribed bactrim.   CT pelvis c/w < from: CT Pelvis w/ IV Cont (09.15.19 @ 23:29) >  Large gas and fluid containing perianal abscess centered in the right   perineum with fistulous connection to the anus posteriorly and   incompletely visualized extension into the inferomedial right buttock.   This is highly suspicious for Vida's gangrene.  Called to evaluate for possible extension abscess to scrotum.  Patient has h/o BPH- no medication. +symptoms of urinary frequency ,urgency, hesitancy, and nocturia. No dysuria/hematuria. Patient has not been sexually active in a while with his partner and had random one night anal sex about 4 weeks ago with condom. No h/o STD's. Patient also has been working out more with weights last few weeks to loose weight.   Patient scheduled for I&D of perianal abscess.         PAST MEDICAL & SURGICAL HISTORY:  HLD (hyperlipidemia)  HTN (hypertension)  No significant past surgical history      MEDICATIONS  (STANDING):  dextrose 5%. 1000 milliLiter(s) (50 mL/Hr) IV Continuous <Continuous>  dextrose 50% Injectable 12.5 Gram(s) IV Push once  dextrose 50% Injectable 25 Gram(s) IV Push once  dextrose 50% Injectable 25 Gram(s) IV Push once  enoxaparin Injectable 40 milliGRAM(s) SubCutaneous every 24 hours  insulin lispro (HumaLOG) corrective regimen sliding scale   SubCutaneous Before meals and at bedtime  lactated ringers Bolus 1000 milliLiter(s) IV Bolus once  lactated ringers. 1000 milliLiter(s) (120 mL/Hr) IV Continuous <Continuous>  piperacillin/tazobactam IVPB.. 3.375 Gram(s) IV Intermittent every 6 hours  vancomycin  IVPB 750 milliGRAM(s) IV Intermittent every 24 hours    MEDICATIONS  (PRN):  dextrose 40% Gel 15 Gram(s) Oral once PRN Blood Glucose LESS THAN 70 milliGRAM(s)/deciliter  glucagon  Injectable 1 milliGRAM(s) IntraMuscular once PRN Glucose LESS THAN 70 milligrams/deciliter  HYDROmorphone  Injectable 0.5 milliGRAM(s) IV Push every 4 hours PRN Moderate Pain (4 - 6)  HYDROmorphone  Injectable 1 milliGRAM(s) IV Push every 4 hours PRN Severe Pain (7 - 10)  ondansetron Injectable 4 milliGRAM(s) IV Push every 6 hours PRN Nausea      Allergies    No Known Allergies    Intolerances        SOCIAL HISTORY:    FAMILY HISTORY:      Vital Signs Last 24 Hrs  T(C): 37 (16 Sep 2019 03:37), Max: 37.2 (16 Sep 2019 01:00)  T(F): 98.6 (16 Sep 2019 03:37), Max: 99 (16 Sep 2019 01:00)  HR: 99 (16 Sep 2019 03:37) (89 - 102)  BP: 94/46 (16 Sep 2019 03:37) (94/46 - 107/73)  BP(mean): 66 (16 Sep 2019 02:09) (66 - 66)  RR: 18 (16 Sep 2019 03:37) (16 - 22)  SpO2: 100% (16 Sep 2019 03:37) (95% - 100%)    On PE:  General: alert and awake  Abdomen: soft, NT, ND  : circumcised male, bilat testes descended, NT. Mild erythema skin posterior scrotum, NT.   +large R perianal/R perineum +tender to touch.     LABS:                        14.8   20.0  )-----------( 354      ( 15 Sep 2019 19:39 )             44.3     09-15    140  |  108  |  41<H>  ----------------------------<  116<H>  4.3   |  25  |  1.77<H>    Ca    7.8<L>      15 Sep 2019 22:05    TPro  7.0  /  Alb  2.5<L>  /  TBili  0.6  /  DBili  x   /  AST  55<H>  /  ALT  84<H>  /  AlkPhos  115  09-15    PT/INR - ( 15 Sep 2019 20:53 )   PT: 17.9 sec;   INR: 1.60          PTT - ( 15 Sep 2019 20:53 )  PTT:28.0 sec      RADIOLOGY & ADDITIONAL STUDIES: CT pelvis as above HPI: 73 yo male with h/o pre dm, htn, hld, dm, presented to ED earlier c/o rectal pain x 5 weeks which worsened in last 5 days with subjective fevers. Voiding ok. Was seen by MD yesterday and prescribed bactrim.   CT pelvis c/w < from: CT Pelvis w/ IV Cont (09.15.19 @ 23:29) >  Large gas and fluid containing perianal abscess centered in the right   perineum with fistulous connection to the anus posteriorly and   incompletely visualized extension into the inferomedial right buttock.   This is highly suspicious for Vida's gangrene.  Called to evaluate for possible extension abscess to scrotum.  Patient has h/o BPH- no medication. +symptoms of urinary frequency ,urgency, hesitancy, and nocturia. No dysuria/hematuria. Patient has not been sexually active in a while with his partner and had random one night anal sex about 4 weeks ago with condom. No h/o STD's. Patient also has been working out more with weights last few weeks to loose weight.   Patient scheduled for I&D of perianal abscess.         PAST MEDICAL & SURGICAL HISTORY:  HLD (hyperlipidemia)  HTN (hypertension)  No significant past surgical history      MEDICATIONS  (STANDING):  dextrose 5%. 1000 milliLiter(s) (50 mL/Hr) IV Continuous <Continuous>  dextrose 50% Injectable 12.5 Gram(s) IV Push once  dextrose 50% Injectable 25 Gram(s) IV Push once  dextrose 50% Injectable 25 Gram(s) IV Push once  enoxaparin Injectable 40 milliGRAM(s) SubCutaneous every 24 hours  insulin lispro (HumaLOG) corrective regimen sliding scale   SubCutaneous Before meals and at bedtime  lactated ringers Bolus 1000 milliLiter(s) IV Bolus once  lactated ringers. 1000 milliLiter(s) (120 mL/Hr) IV Continuous <Continuous>  piperacillin/tazobactam IVPB.. 3.375 Gram(s) IV Intermittent every 6 hours  vancomycin  IVPB 750 milliGRAM(s) IV Intermittent every 24 hours    MEDICATIONS  (PRN):  dextrose 40% Gel 15 Gram(s) Oral once PRN Blood Glucose LESS THAN 70 milliGRAM(s)/deciliter  glucagon  Injectable 1 milliGRAM(s) IntraMuscular once PRN Glucose LESS THAN 70 milligrams/deciliter  HYDROmorphone  Injectable 0.5 milliGRAM(s) IV Push every 4 hours PRN Moderate Pain (4 - 6)  HYDROmorphone  Injectable 1 milliGRAM(s) IV Push every 4 hours PRN Severe Pain (7 - 10)  ondansetron Injectable 4 milliGRAM(s) IV Push every 6 hours PRN Nausea      Allergies    No Known Allergies    Intolerances        SOCIAL HISTORY:    FAMILY HISTORY:      Vital Signs Last 24 Hrs  T(C): 37 (16 Sep 2019 03:37), Max: 37.2 (16 Sep 2019 01:00)  T(F): 98.6 (16 Sep 2019 03:37), Max: 99 (16 Sep 2019 01:00)  HR: 99 (16 Sep 2019 03:37) (89 - 102)  BP: 94/46 (16 Sep 2019 03:37) (94/46 - 107/73)  BP(mean): 66 (16 Sep 2019 02:09) (66 - 66)  RR: 18 (16 Sep 2019 03:37) (16 - 22)  SpO2: 100% (16 Sep 2019 03:37) (95% - 100%)    On PE:  General: alert and awake  Abdomen: soft, NT, ND  : circumcised male, bilat testes descended, NT. Mild erythema skin posterior scrotum, NT.   +large R perianal/R perineum +tender to touch.     LABS:                        14.8   20.0  )-----------( 354      ( 15 Sep 2019 19:39 )             44.3     09-15    140  |  108  |  41<H>  ----------------------------<  116<H>  4.3   |  25  |  1.77<H>    Ca    7.8<L>      15 Sep 2019 22:05    TPro  7.0  /  Alb  2.5<L>  /  TBili  0.6  /  DBili  x   /  AST  55<H>  /  ALT  84<H>  /  AlkPhos  115  09-15    PT/INR - ( 15 Sep 2019 20:53 )   PT: 17.9 sec;   INR: 1.60          PTT - ( 15 Sep 2019 20:53 )  PTT:28.0 sec  Urinalysis Basic - ( 16 Sep 2019 04:25 )    Color: Yellow / Appearance: Clear / SG: <=1.005 / pH: x  Gluc: x / Ketone: NEGATIVE  / Bili: Negative / Urobili: 0.2 E.U./dL   Blood: x / Protein: NEGATIVE mg/dL / Nitrite: NEGATIVE   Leuk Esterase: NEGATIVE / RBC: x / WBC x   Sq Epi: x / Non Sq Epi: x / Bacteria: x          RADIOLOGY & ADDITIONAL STUDIES: CT pelvis as above HPI: 75 yo male with h/o pre dm, htn, hld, dm, presented to ED earlier c/o rectal pain x 5 weeks which worsened in last 5 days with subjective fevers. Voiding ok. Was seen by MD yesterday and prescribed bactrim.   CT pelvis c/w < from: CT Pelvis w/ IV Cont (09.15.19 @ 23:29) >  Large gas and fluid containing perianal abscess centered in the right   perineum with fistulous connection to the anus posteriorly and   incompletely visualized extension into the inferomedial right buttock.   This is highly suspicious for Vida's gangrene.  Called to evaluate for possible extension abscess to scrotum.  Patient has h/o BPH- no medication. +symptoms of urinary frequency ,urgency, hesitancy, and nocturia. No dysuria/hematuria. Patient has not been sexually active in a while with his partner and had random one night anal sex about 4 weeks ago with condom. No h/o STD's. Patient also has been working out more with weights last few weeks to loose weight.   Patient scheduled for I&D of perianal abscess.         PAST MEDICAL & SURGICAL HISTORY:  HLD (hyperlipidemia)  HTN (hypertension)  No significant past surgical history      MEDICATIONS  (STANDING):  dextrose 5%. 1000 milliLiter(s) (50 mL/Hr) IV Continuous <Continuous>  dextrose 50% Injectable 12.5 Gram(s) IV Push once  dextrose 50% Injectable 25 Gram(s) IV Push once  dextrose 50% Injectable 25 Gram(s) IV Push once  enoxaparin Injectable 40 milliGRAM(s) SubCutaneous every 24 hours  insulin lispro (HumaLOG) corrective regimen sliding scale   SubCutaneous Before meals and at bedtime  lactated ringers Bolus 1000 milliLiter(s) IV Bolus once  lactated ringers. 1000 milliLiter(s) (120 mL/Hr) IV Continuous <Continuous>  piperacillin/tazobactam IVPB.. 3.375 Gram(s) IV Intermittent every 6 hours  vancomycin  IVPB 750 milliGRAM(s) IV Intermittent every 24 hours    MEDICATIONS  (PRN):  dextrose 40% Gel 15 Gram(s) Oral once PRN Blood Glucose LESS THAN 70 milliGRAM(s)/deciliter  glucagon  Injectable 1 milliGRAM(s) IntraMuscular once PRN Glucose LESS THAN 70 milligrams/deciliter  HYDROmorphone  Injectable 0.5 milliGRAM(s) IV Push every 4 hours PRN Moderate Pain (4 - 6)  HYDROmorphone  Injectable 1 milliGRAM(s) IV Push every 4 hours PRN Severe Pain (7 - 10)  ondansetron Injectable 4 milliGRAM(s) IV Push every 6 hours PRN Nausea      Allergies    No Known Allergies    Intolerances        SOCIAL HISTORY:    FAMILY HISTORY:      Vital Signs Last 24 Hrs  T(C): 37 (16 Sep 2019 03:37), Max: 37.2 (16 Sep 2019 01:00)  T(F): 98.6 (16 Sep 2019 03:37), Max: 99 (16 Sep 2019 01:00)  HR: 99 (16 Sep 2019 03:37) (89 - 102)  BP: 94/46 (16 Sep 2019 03:37) (94/46 - 107/73)  BP(mean): 66 (16 Sep 2019 02:09) (66 - 66)  RR: 18 (16 Sep 2019 03:37) (16 - 22)  SpO2: 100% (16 Sep 2019 03:37) (95% - 100%)    On PE:  General: alert and awake  Abdomen: soft, NT, ND  : circumcised male, bilat testes descended, NT. Mild erythema skin posterior scrotum, NT.   +large R perianal fluctuant area. Perineum is edematous with no crepitus, no tenderness. Right scrotum is edematous with no tenderness    LABS:                        14.8   20.0  )-----------( 354      ( 15 Sep 2019 19:39 )             44.3     09-15    140  |  108  |  41<H>  ----------------------------<  116<H>  4.3   |  25  |  1.77<H>    Ca    7.8<L>      15 Sep 2019 22:05    TPro  7.0  /  Alb  2.5<L>  /  TBili  0.6  /  DBili  x   /  AST  55<H>  /  ALT  84<H>  /  AlkPhos  115  09-15    PT/INR - ( 15 Sep 2019 20:53 )   PT: 17.9 sec;   INR: 1.60          PTT - ( 15 Sep 2019 20:53 )  PTT:28.0 sec  Urinalysis Basic - ( 16 Sep 2019 04:25 )    Color: Yellow / Appearance: Clear / SG: <=1.005 / pH: x  Gluc: x / Ketone: NEGATIVE  / Bili: Negative / Urobili: 0.2 E.U./dL   Blood: x / Protein: NEGATIVE mg/dL / Nitrite: NEGATIVE   Leuk Esterase: NEGATIVE / RBC: x / WBC x   Sq Epi: x / Non Sq Epi: x / Bacteria: x          RADIOLOGY & ADDITIONAL STUDIES: CT pelvis as above

## 2019-09-16 NOTE — ED ADULT NURSE REASSESSMENT NOTE - NS ED NURSE REASSESS COMMENT FT1
repeat lactate sent, called floor to give report kept on hold 5 mins called valery warren they state call, called back gave report to danyell

## 2019-09-16 NOTE — CONSULT NOTE ADULT - SUBJECTIVE AND OBJECTIVE BOX
HPI: 74yMale went to [facility] with anal pain x 5d, CT showed perirectal abscess, transferred to [facility] for intervention. taken to OR, found perirectal abscess with fistula to   ischiorectal abscess drained out 100 cc of pus. Tract identified to abscess from posterior anal canal.     73 y/o male w/ PMH of pre-diabetes, HTN, HLD, BPH, who presented to [facility] ED w/ constant non-radiating anal pain over the five days with subjective fever and chills. He also states that he has had perianal itching over the past 5 weeks which was treated w/ preparation H by his outpatient doctor. [facility] with Bactrim and Ibuprofen, which was insufficient in alleviating his pain. He has no change in bowel movements, no diarrhea, or blood in his stool. He denies N/V, abdominal pain, dysuria, chest pain and SOB. CT performed in the ED showed a large right perianal abscess with fistulous connection to the anus posteriorly as well as extension into the right perineum, buttock and right leg. The patient has been admitted to [facility] for further management.     PMH:     MEDS:     Allergies    No Known Allergies    Intolerances        ICU Vital Signs Last 24 Hrs  T(F): 98.6 (09-16-19 @ 03:37), Max: 99 (09-16-19 @ 01:00)  HR: 66 (09-16-19 @ 10:06) (66 - 102)  BP: 137/64 (09-16-19 @ 04:19) (94/46 - 137/64)  BP(mean): 92 (09-16-19 @ 04:19) (66 - 92)  ABP: --  RR: 16 (09-16-19 @ 10:06) (16 - 22)  SpO2: 98% (09-16-19 @ 10:06) (95% - 100%)    PHYSICAL EXAM:     Neurological: AAOx3, CNII-XII intact,  strength 5/5 b/l  Cardiovascular: RRR  Respiratory: CTA  Gastrointestinal: soft, NT, ND, BS+  Extremities: warm, no dependent edema  Vascular: no cyanosis/erythema  LABS:    09-15    140  |  108  |  41<H>  ----------------------------<  116<H>  4.3   |  25  |  1.77<H>    Ca    7.8<L>      15 Sep 2019 22:05    TPro  7.0  /  Alb  2.5<L>  /  TBili  0.6  /  DBili  x   /  AST  55<H>  /  ALT  84<H>  /  AlkPhos  115  09-15  LIVER FUNCTIONS - ( 15 Sep 2019 19:39 )  Alb: 2.5 g/dL / Pro: 7.0 g/dL / ALK PHOS: 115 U/L / ALT: 84 U/L / AST: 55 U/L / GGT: x                               12.2   27.16 )-----------( 336      ( 16 Sep 2019 09:57 )             38.0   PT/INR - ( 16 Sep 2019 09:57 )   PT: 16.7 sec;   INR: 1.46          PTT - ( 16 Sep 2019 09:57 )  PTT:30.0 sec  ABG - ( 16 Sep 2019 10:12 )  pH, Arterial: 7.35  pH, Blood: x     /  pCO2: 33    /  pO2: 172   / HCO3: 18    / Base Excess: -6.9  /  SaO2: 99              Urinalysis Basic - ( 16 Sep 2019 04:25 )    Color: Yellow / Appearance: Clear / SG: <=1.005 / pH: x  Gluc: x / Ketone: NEGATIVE  / Bili: Negative / Urobili: 0.2 E.U./dL   Blood: x / Protein: NEGATIVE mg/dL / Nitrite: NEGATIVE   Leuk Esterase: NEGATIVE / RBC: x / WBC x   Sq Epi: x / Non Sq Epi: x / Bacteria: x    CAPILLARY BLOOD GLUCOSE      POCT Blood Glucose.: 108 mg/dL (16 Sep 2019 02:02)    Dumont:	  [ ] None	[ ] Daily Dumont Order Placed	   Indication:	  [ ] Strict I and O's    [ ] Obstruction     [ ] Incontinence + Stage 3 or 4 Decubitus  Central Line:  [ ] None	   [ ]  Medication / TPN Administration     [ ] No Peripheral IV     A/P:    NEURO:  CV:  PULM:   GI/FEN:  :  ENDO:  ID:  PPX:  LINES:  WOUNDS/DRAINS: HPI: 74yMale went to [facility] with anal pain x 5d, CT showed large gas and fluid containing perianal abscess to right perineum with fistulous tract to anus posteriorly. Pt endorses recent receptive anal sex with a stranger/foreigner a few days ago prior to onset of anal pain. pt transferred to [facility] for surgical intervention. 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 Tract identified to abscess from posterior anal canal. colorectal consult intraop for repair.   pt had progressive hypotension intraop, required neosynephrine intraop. pt transferred to SICU for septic shock.     PMH: pre-diabetes, HTN.     MEDS: fenofibrate 48qd. lovaza 2000 bid. losartan 50qd, vit D3, metformin 500 bid.     Allergies: NKDA    ICU Vital Signs Last 24 Hrs  T(F): 98.6 (09-16-19 @ 03:37), Max: 99 (09-16-19 @ 01:00)  HR: 66 (09-16-19 @ 10:06) (66 - 102)  BP: 137/64 (09-16-19 @ 04:19) (94/46 - 137/64)  BP(mean): 92 (09-16-19 @ 04:19) (66 - 92)  ABP: --  RR: 16 (09-16-19 @ 10:06) (16 - 22)  SpO2: 98% (09-16-19 @ 10:06) (95% - 100%)    PHYSICAL EXAM:     Neurological: AAOx3, CNII-XII intact,  strength 5/5 b/l  Cardiovascular: RRR  Respiratory: CTA  Gastrointestinal: soft, NT, ND, BS+  Extremities: warm, no dependent edema  Vascular: no cyanosis/erythema  LABS:    09-15    140  |  108  |  41<H>  ----------------------------<  116<H>  4.3   |  25  |  1.77<H>    Ca    7.8<L>      15 Sep 2019 22:05    TPro  7.0  /  Alb  2.5<L>  /  TBili  0.6  /  DBili  x   /  AST  55<H>  /  ALT  84<H>  /  AlkPhos  115  09-15  LIVER FUNCTIONS - ( 15 Sep 2019 19:39 )  Alb: 2.5 g/dL / Pro: 7.0 g/dL / ALK PHOS: 115 U/L / ALT: 84 U/L / AST: 55 U/L / GGT: x                               12.2   27.16 )-----------( 336      ( 16 Sep 2019 09:57 )             38.0   PT/INR - ( 16 Sep 2019 09:57 )   PT: 16.7 sec;   INR: 1.46          PTT - ( 16 Sep 2019 09:57 )  PTT:30.0 sec  ABG - ( 16 Sep 2019 10:12 )  pH, Arterial: 7.35  pH, Blood: x     /  pCO2: 33    /  pO2: 172   / HCO3: 18    / Base Excess: -6.9  /  SaO2: 99              Urinalysis Basic - ( 16 Sep 2019 04:25 )    Color: Yellow / Appearance: Clear / SG: <=1.005 / pH: x  Gluc: x / Ketone: NEGATIVE  / Bili: Negative / Urobili: 0.2 E.U./dL   Blood: x / Protein: NEGATIVE mg/dL / Nitrite: NEGATIVE   Leuk Esterase: NEGATIVE / RBC: x / WBC x   Sq Epi: x / Non Sq Epi: x / Bacteria: x    CAPILLARY BLOOD GLUCOSE      POCT Blood Glucose.: 108 mg/dL (16 Sep 2019 02:02)    Dumont:	  [ ] None	[ ] Daily Dumont Order Placed	   Indication:	  [ ] Strict I and O's    [ ] Obstruction     [ ] Incontinence + Stage 3 or 4 Decubitus  Central Line:  [ ] None	   [ ]  Medication / TPN Administration     [ ] No Peripheral IV     A/P:    NEURO:  CV:  PULM:   GI/FEN:  :  ENDO:  ID:  PPX:  LINES:  WOUNDS/DRAINS: HPI: 74yMale went to [facility] with anal [facility] x 5d, CT showed large gas and fluid containing perianal abscess to right perineum with fistulous tract to anus posteriorly. Pt endorses recent receptive anal sex with a stranger/foreigner a few days ago prior to onset of anal pain. pt transferred to [facility] for surgical intervention. In OR, found perirectal abscess with fistula to ischiorectal abscess, drained out 100 cc of pus, cx sent, tract identified to abscess from posterior anal canal, colorectal consulted intraop for repair. EBL minimal, Cryst 2500, ,. pt had progressive hypotension intraop, required neosynephrine intraop. pt transferred to SICU for septic shock.     PMH: diabetes, HTN. HL    MEDS: fenofibrate 48qd. lovaza 2000 bid. losartan 50qd, vit D3, metformin 500 bid.     Allergies: NKDA    ICU Vital Signs Last 24 Hrs  T(F): 98.6 (09-16-19 @ 03:37), Max: 99 (09-16-19 @ 01:00)  HR: 66 (09-16-19 @ 10:06) (66 - 102)  BP: 137/64 (09-16-19 @ 04:19) (94/46 - 137/64)  BP(mean): 92 (09-16-19 @ 04:19) (66 - 92)  ABP: --  RR: 16 (09-16-19 @ 10:06) (16 - 22)  SpO2: 98% (09-16-19 @ 10:06) (95% - 100%)    PHYSICAL EXAM:     Neurological: sedated   Cardiovascular: RRR  Respiratory: CTA  Gastrointestinal: soft, NT, ND, BS+, rectal wound packed with betadine kerlix.   Extremities: warm, no dependent edema  Vascular: no cyanosis/erythema      LABS:    09-15    140  |  108  |  41<H>  ----------------------------<  116<H>  4.3   |  25  |  1.77<H>    Ca    7.8<L>      15 Sep 2019 22:05    TPro  7.0  /  Alb  2.5<L>  /  TBili  0.6  /  DBili  x   /  AST  55<H>  /  ALT  84<H>  /  AlkPhos  115  09-15  LIVER FUNCTIONS - ( 15 Sep 2019 19:39 )  Alb: 2.5 g/dL / Pro: 7.0 g/dL / ALK PHOS: 115 U/L / ALT: 84 U/L / AST: 55 U/L / GGT: x                               12.2   27.16 )-----------( 336      ( 16 Sep 2019 09:57 )             38.0   PT/INR - ( 16 Sep 2019 09:57 )   PT: 16.7 sec;   INR: 1.46          PTT - ( 16 Sep 2019 09:57 )  PTT:30.0 sec  ABG - ( 16 Sep 2019 10:12 )  pH, Arterial: 7.35  pH, Blood: x     /  pCO2: 33    /  pO2: 172   / HCO3: 18    / Base Excess: -6.9  /  SaO2: 99              Urinalysis Basic - ( 16 Sep 2019 04:25 )    Color: Yellow / Appearance: Clear / SG: <=1.005 / pH: x  Gluc: x / Ketone: NEGATIVE  / Bili: Negative / Urobili: 0.2 E.U./dL   Blood: x / Protein: NEGATIVE mg/dL / Nitrite: NEGATIVE   Leuk Esterase: NEGATIVE / RBC: x / WBC x   Sq Epi: x / Non Sq Epi: x / Bacteria: x    CAPILLARY BLOOD GLUCOSE      POCT Blood Glucose.: 108 mg/dL (16 Sep 2019 02:02)    Dumont:	  [ ] None	[x ] Daily Dumont Order Placed	   Indication:	  [x ] Strict I and O's    [ ] Obstruction     [ ] Incontinence + Stage 3 or 4 Decubitus  Central Line:  [x ] None	   [ ]  Medication / TPN Administration     [ ] No Peripheral IV

## 2019-09-16 NOTE — H&P ADULT - HISTORY OF PRESENT ILLNESS
75 y/o male w/ PMH of pre-diabetes, HTN, HLD, BPH, who presented to [facility] ED w/ constant non-radiating anal pain over the five days with subjective fever and chills. He also states that he has had perianal itching over the past 5 weeks which was treated w/ preparation H by his outpatient doctor. He was seen at [facility] yesterday and treated with Bactrim and Ibuprofen, which was insufficient in alleviating his pain. He has no change in bowel movements, no diarrhea, or blood in his stool. He denies N/V, abdominal pain, dysuria, chest pain and SOB. CT performed in the ED showed a large right perianal abscess with fistulous connection to the anus posteriorly as well as extension into the right perineum, buttock and right leg. The patient has been admitted to [facility] for further management.

## 2019-09-16 NOTE — H&P ADULT - NSHPLABSRESULTS_GEN_ALL_CORE
LABS:                        14.8   20.0  )-----------( 354      ( 15 Sep 2019 19:39 )             44.3     09-15    140  |  108  |  41<H>  ----------------------------<  116<H>  4.3   |  25  |  1.77<H>    Ca    7.8<L>      15 Sep 2019 22:05    TPro  7.0  /  Alb  2.5<L>  /  TBili  0.6  /  DBili  x   /  AST  55<H>  /  ALT  84<H>  /  AlkPhos  115  09-15    PT/INR - ( 15 Sep 2019 20:53 )   PT: 17.9 sec;   INR: 1.60          PTT - ( 15 Sep 2019 20:53 )  PTT:28.0 sec      RADIOLOGY & ADDITIONAL STUDIES: < from: CT Pelvis w/ IV Cont (09.15.19 @ 23:29) >      Large gas and fluid containing perianal abscess centered in the right   perineum with fistulous connection to the anus posteriorly and   incompletely visualized extension into the inferomedial right buttock.   This is highly suspicious for Vida's gangrene.    < end of copied text >

## 2019-09-17 LAB
ANION GAP SERPL CALC-SCNC: 11 MMOL/L — SIGNIFICANT CHANGE UP (ref 5–17)
BUN SERPL-MCNC: 32 MG/DL — HIGH (ref 7–23)
CALCIUM SERPL-MCNC: 7.7 MG/DL — LOW (ref 8.4–10.5)
CHLORIDE SERPL-SCNC: 107 MMOL/L — SIGNIFICANT CHANGE UP (ref 96–108)
CO2 SERPL-SCNC: 19 MMOL/L — LOW (ref 22–31)
CREAT SERPL-MCNC: 1.24 MG/DL — SIGNIFICANT CHANGE UP (ref 0.5–1.3)
GLUCOSE BLDC GLUCOMTR-MCNC: 115 MG/DL — HIGH (ref 70–99)
GLUCOSE BLDC GLUCOMTR-MCNC: 130 MG/DL — HIGH (ref 70–99)
GLUCOSE BLDC GLUCOMTR-MCNC: 150 MG/DL — HIGH (ref 70–99)
GLUCOSE BLDC GLUCOMTR-MCNC: 163 MG/DL — HIGH (ref 70–99)
GLUCOSE BLDC GLUCOMTR-MCNC: 226 MG/DL — HIGH (ref 70–99)
GLUCOSE SERPL-MCNC: 240 MG/DL — HIGH (ref 70–99)
GRAM STN FLD: SIGNIFICANT CHANGE UP
HCT VFR BLD CALC: 36.6 % — LOW (ref 39–50)
HCV AB S/CO SERPL IA: 0.1 S/CO — SIGNIFICANT CHANGE UP
HCV AB SERPL-IMP: SIGNIFICANT CHANGE UP
HGB BLD-MCNC: 11.7 G/DL — LOW (ref 13–17)
HIV 1+2 AB+HIV1 P24 AG SERPL QL IA: SIGNIFICANT CHANGE UP
MAGNESIUM SERPL-MCNC: 2.8 MG/DL — HIGH (ref 1.6–2.6)
MCHC RBC-ENTMCNC: 29.2 PG — SIGNIFICANT CHANGE UP (ref 27–34)
MCHC RBC-ENTMCNC: 32 GM/DL — SIGNIFICANT CHANGE UP (ref 32–36)
MCV RBC AUTO: 91.3 FL — SIGNIFICANT CHANGE UP (ref 80–100)
METHOD TYPE: SIGNIFICANT CHANGE UP
NRBC # BLD: 0 /100 WBCS — SIGNIFICANT CHANGE UP (ref 0–0)
PHOSPHATE SERPL-MCNC: 3.5 MG/DL — SIGNIFICANT CHANGE UP (ref 2.5–4.5)
PLATELET # BLD AUTO: 372 K/UL — SIGNIFICANT CHANGE UP (ref 150–400)
POTASSIUM SERPL-MCNC: 5.2 MMOL/L — SIGNIFICANT CHANGE UP (ref 3.5–5.3)
POTASSIUM SERPL-SCNC: 5.2 MMOL/L — SIGNIFICANT CHANGE UP (ref 3.5–5.3)
RBC # BLD: 4.01 M/UL — LOW (ref 4.2–5.8)
RBC # FLD: 13.2 % — SIGNIFICANT CHANGE UP (ref 10.3–14.5)
SODIUM SERPL-SCNC: 137 MMOL/L — SIGNIFICANT CHANGE UP (ref 135–145)
WBC # BLD: 23.95 K/UL — HIGH (ref 3.8–10.5)
WBC # FLD AUTO: 23.95 K/UL — HIGH (ref 3.8–10.5)

## 2019-09-17 PROCEDURE — 99233 SBSQ HOSP IP/OBS HIGH 50: CPT | Mod: GC

## 2019-09-17 PROCEDURE — 71045 X-RAY EXAM CHEST 1 VIEW: CPT | Mod: 26

## 2019-09-17 RX ORDER — SODIUM CHLORIDE 9 MG/ML
1000 INJECTION INTRAMUSCULAR; INTRAVENOUS; SUBCUTANEOUS
Refills: 0 | Status: DISCONTINUED | OUTPATIENT
Start: 2019-09-17 | End: 2019-09-17

## 2019-09-17 RX ORDER — DOCUSATE SODIUM 100 MG
100 CAPSULE ORAL
Refills: 0 | Status: DISCONTINUED | OUTPATIENT
Start: 2019-09-17 | End: 2019-09-18

## 2019-09-17 RX ORDER — VANCOMYCIN HCL 1 G
1250 VIAL (EA) INTRAVENOUS EVERY 12 HOURS
Refills: 0 | Status: DISCONTINUED | OUTPATIENT
Start: 2019-09-17 | End: 2019-09-18

## 2019-09-17 RX ORDER — HYDROMORPHONE HYDROCHLORIDE 2 MG/ML
0.5 INJECTION INTRAMUSCULAR; INTRAVENOUS; SUBCUTANEOUS EVERY 4 HOURS
Refills: 0 | Status: DISCONTINUED | OUTPATIENT
Start: 2019-09-17 | End: 2019-09-17

## 2019-09-17 RX ORDER — OXYCODONE AND ACETAMINOPHEN 5; 325 MG/1; MG/1
2 TABLET ORAL EVERY 4 HOURS
Refills: 0 | Status: DISCONTINUED | OUTPATIENT
Start: 2019-09-17 | End: 2019-09-22

## 2019-09-17 RX ORDER — OXYCODONE AND ACETAMINOPHEN 5; 325 MG/1; MG/1
1 TABLET ORAL EVERY 4 HOURS
Refills: 0 | Status: DISCONTINUED | OUTPATIENT
Start: 2019-09-17 | End: 2019-09-22

## 2019-09-17 RX ORDER — HYDROMORPHONE HYDROCHLORIDE 2 MG/ML
1 INJECTION INTRAMUSCULAR; INTRAVENOUS; SUBCUTANEOUS EVERY 4 HOURS
Refills: 0 | Status: DISCONTINUED | OUTPATIENT
Start: 2019-09-17 | End: 2019-09-17

## 2019-09-17 RX ORDER — HYDROMORPHONE HYDROCHLORIDE 2 MG/ML
0.5 INJECTION INTRAMUSCULAR; INTRAVENOUS; SUBCUTANEOUS EVERY 4 HOURS
Refills: 0 | Status: DISCONTINUED | OUTPATIENT
Start: 2019-09-17 | End: 2019-09-20

## 2019-09-17 RX ADMIN — PIPERACILLIN AND TAZOBACTAM 200 GRAM(S): 4; .5 INJECTION, POWDER, LYOPHILIZED, FOR SOLUTION INTRAVENOUS at 23:05

## 2019-09-17 RX ADMIN — Medication 100 MILLIGRAM(S): at 17:43

## 2019-09-17 RX ADMIN — PIPERACILLIN AND TAZOBACTAM 200 GRAM(S): 4; .5 INJECTION, POWDER, LYOPHILIZED, FOR SOLUTION INTRAVENOUS at 06:02

## 2019-09-17 RX ADMIN — Medication 100 MILLIGRAM(S): at 07:47

## 2019-09-17 RX ADMIN — CHLORHEXIDINE GLUCONATE 15 MILLILITER(S): 213 SOLUTION TOPICAL at 17:42

## 2019-09-17 RX ADMIN — Medication 2: at 11:12

## 2019-09-17 RX ADMIN — ENOXAPARIN SODIUM 40 MILLIGRAM(S): 100 INJECTION SUBCUTANEOUS at 11:13

## 2019-09-17 RX ADMIN — Medication 166.67 MILLIGRAM(S): at 17:42

## 2019-09-17 RX ADMIN — PIPERACILLIN AND TAZOBACTAM 200 GRAM(S): 4; .5 INJECTION, POWDER, LYOPHILIZED, FOR SOLUTION INTRAVENOUS at 11:12

## 2019-09-17 RX ADMIN — CHLORHEXIDINE GLUCONATE 1 APPLICATION(S): 213 SOLUTION TOPICAL at 07:18

## 2019-09-17 RX ADMIN — CHLORHEXIDINE GLUCONATE 15 MILLILITER(S): 213 SOLUTION TOPICAL at 06:02

## 2019-09-17 RX ADMIN — PIPERACILLIN AND TAZOBACTAM 200 GRAM(S): 4; .5 INJECTION, POWDER, LYOPHILIZED, FOR SOLUTION INTRAVENOUS at 17:42

## 2019-09-17 RX ADMIN — Medication 166.67 MILLIGRAM(S): at 04:00

## 2019-09-17 RX ADMIN — PANTOPRAZOLE SODIUM 40 MILLIGRAM(S): 20 TABLET, DELAYED RELEASE ORAL at 11:12

## 2019-09-17 RX ADMIN — Medication 100 MILLIGRAM(S): at 16:00

## 2019-09-17 RX ADMIN — Medication 4: at 06:07

## 2019-09-17 NOTE — DIETITIAN INITIAL EVALUATION ADULT. - OTHER INFO
75 yo/male with PMHx pre-DM, HTN, HLD, BPH presented w/anal pain, fever, and chills. CT scan showing large R. perianal abscess w/fistulous connection to anus posteriorly and extension into R. perineum, buttock, and R. leg. S/p I&D of abscess and fistula repair on 9/16. Course c/b hypotension/septic shock requiring pt to remain intubated and on pressors post-op. Pt successfully extubated this morning. Seen in room, awake, alert, breathing comfortably on NC. MAP 62- no longer on pressors. Pt endorses minimal PO intake x 1 week, only w/intake of 2-3 Ensure per day. Pt reports main cause of poor PO intake was anal pain and irregular BMs. Normally pt reports eating well, NKFA. Currently NPO, pt does report feeling hungry. No current complaints of N/V or any pain.  Denies difficulty chewing or swallowing but does report a dry mouth/throat. Skin noted w/surgical wound. Discussed diet advancement pending surgical team discretion. Discussed importance of adequate protein and fluid intake once able to take PO. Will continue to follow per RD protocol.

## 2019-09-17 NOTE — DIETITIAN INITIAL EVALUATION ADULT. - ADD RECOMMEND
1. As medically feasible, please initiate Consistent Carbohydrate/Clear Liquid diet and advance as tolerated to Consistent Carbohydrate/Low Fiber diet. 2. Monitor lytes and replete prn. POC BG q6hrs 3. Pain and bowel regimens per team 4. Provide further nutrition education as appropriate

## 2019-09-17 NOTE — DIETITIAN INITIAL EVALUATION ADULT. - ENERGY NEEDS
Height 66"; #; #; 120%IBW  BMI 27.5  ActualBW used for calculations as pt between % of IBW. Needs estimated for maintenance in older adults; increased for post-op/wound healing

## 2019-09-18 LAB
-  AMPICILLIN/SULBACTAM: SIGNIFICANT CHANGE UP
-  AMPICILLIN: SIGNIFICANT CHANGE UP
-  CEFAZOLIN: SIGNIFICANT CHANGE UP
-  CEFTRIAXONE: SIGNIFICANT CHANGE UP
-  CEFTRIAXONE: SIGNIFICANT CHANGE UP
-  CLINDAMYCIN: SIGNIFICANT CHANGE UP
-  ERYTHROMYCIN: SIGNIFICANT CHANGE UP
-  GENTAMICIN: SIGNIFICANT CHANGE UP
-  LEVOFLOXACIN: 0.12 — SIGNIFICANT CHANGE UP
-  PENICILLIN: SIGNIFICANT CHANGE UP
-  PIPERACILLIN/TAZOBACTAM: SIGNIFICANT CHANGE UP
-  TOBRAMYCIN: SIGNIFICANT CHANGE UP
-  TRIMETHOPRIM/SULFAMETHOXAZOLE: SIGNIFICANT CHANGE UP
-  VANCOMYCIN: SIGNIFICANT CHANGE UP
ANION GAP SERPL CALC-SCNC: 11 MMOL/L — SIGNIFICANT CHANGE UP (ref 5–17)
APPEARANCE UR: CLEAR — SIGNIFICANT CHANGE UP
BILIRUB UR-MCNC: NEGATIVE — SIGNIFICANT CHANGE UP
BUN SERPL-MCNC: 34 MG/DL — HIGH (ref 7–23)
CALCIUM SERPL-MCNC: 8.2 MG/DL — LOW (ref 8.4–10.5)
CHLORIDE SERPL-SCNC: 108 MMOL/L — SIGNIFICANT CHANGE UP (ref 96–108)
CO2 SERPL-SCNC: 22 MMOL/L — SIGNIFICANT CHANGE UP (ref 22–31)
COLOR SPEC: YELLOW — SIGNIFICANT CHANGE UP
CREAT SERPL-MCNC: 1.32 MG/DL — HIGH (ref 0.5–1.3)
DIFF PNL FLD: ABNORMAL
GLUCOSE BLDC GLUCOMTR-MCNC: 118 MG/DL — HIGH (ref 70–99)
GLUCOSE BLDC GLUCOMTR-MCNC: 131 MG/DL — HIGH (ref 70–99)
GLUCOSE BLDC GLUCOMTR-MCNC: 155 MG/DL — HIGH (ref 70–99)
GLUCOSE SERPL-MCNC: 112 MG/DL — HIGH (ref 70–99)
GLUCOSE UR QL: NEGATIVE — SIGNIFICANT CHANGE UP
HCT VFR BLD CALC: 37.1 % — LOW (ref 39–50)
HGB BLD-MCNC: 11.9 G/DL — LOW (ref 13–17)
KETONES UR-MCNC: NEGATIVE — SIGNIFICANT CHANGE UP
LEUKOCYTE ESTERASE UR-ACNC: NEGATIVE — SIGNIFICANT CHANGE UP
MAGNESIUM SERPL-MCNC: 2.2 MG/DL — SIGNIFICANT CHANGE UP (ref 1.6–2.6)
MCHC RBC-ENTMCNC: 28.5 PG — SIGNIFICANT CHANGE UP (ref 27–34)
MCHC RBC-ENTMCNC: 32.1 GM/DL — SIGNIFICANT CHANGE UP (ref 32–36)
MCV RBC AUTO: 89 FL — SIGNIFICANT CHANGE UP (ref 80–100)
METHOD TYPE: SIGNIFICANT CHANGE UP
NITRITE UR-MCNC: NEGATIVE — SIGNIFICANT CHANGE UP
NRBC # BLD: 0 /100 WBCS — SIGNIFICANT CHANGE UP (ref 0–0)
PH UR: 6 — SIGNIFICANT CHANGE UP (ref 5–8)
PHOSPHATE SERPL-MCNC: 2.1 MG/DL — LOW (ref 2.5–4.5)
PLATELET # BLD AUTO: 375 K/UL — SIGNIFICANT CHANGE UP (ref 150–400)
POTASSIUM SERPL-MCNC: 4.9 MMOL/L — SIGNIFICANT CHANGE UP (ref 3.5–5.3)
POTASSIUM SERPL-SCNC: 4.9 MMOL/L — SIGNIFICANT CHANGE UP (ref 3.5–5.3)
PROT UR-MCNC: NEGATIVE MG/DL — SIGNIFICANT CHANGE UP
RBC # BLD: 4.17 M/UL — LOW (ref 4.2–5.8)
RBC # FLD: 13.5 % — SIGNIFICANT CHANGE UP (ref 10.3–14.5)
SODIUM SERPL-SCNC: 141 MMOL/L — SIGNIFICANT CHANGE UP (ref 135–145)
SP GR SPEC: 1.02 — SIGNIFICANT CHANGE UP (ref 1–1.03)
UROBILINOGEN FLD QL: 1 E.U./DL — SIGNIFICANT CHANGE UP
VANCOMYCIN TROUGH SERPL-MCNC: 14.4 UG/ML — SIGNIFICANT CHANGE UP (ref 10–20)
WBC # BLD: 12.87 K/UL — HIGH (ref 3.8–10.5)
WBC # FLD AUTO: 12.87 K/UL — HIGH (ref 3.8–10.5)

## 2019-09-18 PROCEDURE — 99233 SBSQ HOSP IP/OBS HIGH 50: CPT | Mod: GC

## 2019-09-18 PROCEDURE — 71045 X-RAY EXAM CHEST 1 VIEW: CPT | Mod: 26

## 2019-09-18 RX ORDER — VANCOMYCIN HCL 1 G
1250 VIAL (EA) INTRAVENOUS
Refills: 0 | Status: DISCONTINUED | OUTPATIENT
Start: 2019-09-18 | End: 2019-09-18

## 2019-09-18 RX ORDER — AMLODIPINE BESYLATE 2.5 MG/1
5 TABLET ORAL DAILY
Refills: 0 | Status: DISCONTINUED | OUTPATIENT
Start: 2019-09-18 | End: 2019-09-20

## 2019-09-18 RX ORDER — TAMSULOSIN HYDROCHLORIDE 0.4 MG/1
0.4 CAPSULE ORAL AT BEDTIME
Refills: 0 | Status: DISCONTINUED | OUTPATIENT
Start: 2019-09-18 | End: 2019-09-22

## 2019-09-18 RX ADMIN — ENOXAPARIN SODIUM 40 MILLIGRAM(S): 100 INJECTION SUBCUTANEOUS at 12:50

## 2019-09-18 RX ADMIN — Medication 166.67 MILLIGRAM(S): at 16:54

## 2019-09-18 RX ADMIN — AMLODIPINE BESYLATE 5 MILLIGRAM(S): 2.5 TABLET ORAL at 16:53

## 2019-09-18 RX ADMIN — HYDROMORPHONE HYDROCHLORIDE 0.5 MILLIGRAM(S): 2 INJECTION INTRAMUSCULAR; INTRAVENOUS; SUBCUTANEOUS at 15:00

## 2019-09-18 RX ADMIN — HYDROMORPHONE HYDROCHLORIDE 0.5 MILLIGRAM(S): 2 INJECTION INTRAMUSCULAR; INTRAVENOUS; SUBCUTANEOUS at 06:55

## 2019-09-18 RX ADMIN — Medication 166.67 MILLIGRAM(S): at 05:47

## 2019-09-18 RX ADMIN — CHLORHEXIDINE GLUCONATE 1 APPLICATION(S): 213 SOLUTION TOPICAL at 06:12

## 2019-09-18 RX ADMIN — HYDROMORPHONE HYDROCHLORIDE 0.5 MILLIGRAM(S): 2 INJECTION INTRAMUSCULAR; INTRAVENOUS; SUBCUTANEOUS at 08:27

## 2019-09-18 RX ADMIN — HYDROMORPHONE HYDROCHLORIDE 0.5 MILLIGRAM(S): 2 INJECTION INTRAMUSCULAR; INTRAVENOUS; SUBCUTANEOUS at 14:26

## 2019-09-18 RX ADMIN — Medication 2: at 17:39

## 2019-09-18 RX ADMIN — PIPERACILLIN AND TAZOBACTAM 200 GRAM(S): 4; .5 INJECTION, POWDER, LYOPHILIZED, FOR SOLUTION INTRAVENOUS at 12:50

## 2019-09-18 RX ADMIN — PIPERACILLIN AND TAZOBACTAM 200 GRAM(S): 4; .5 INJECTION, POWDER, LYOPHILIZED, FOR SOLUTION INTRAVENOUS at 06:11

## 2019-09-18 RX ADMIN — Medication 62.5 MILLIMOLE(S): at 14:44

## 2019-09-18 RX ADMIN — TAMSULOSIN HYDROCHLORIDE 0.4 MILLIGRAM(S): 0.4 CAPSULE ORAL at 13:45

## 2019-09-18 NOTE — PHYSICAL THERAPY INITIAL EVALUATION ADULT - ADDITIONAL COMMENTS
Patient reports previously independent with all ADLs/IADLs prior to admission. No HHA. Denies history of mechanical falls. Reports partner (Gary) is unable to assist during the day 2/2 work however is in good functional health (does not use AD, no falls, no HHA).

## 2019-09-18 NOTE — PHYSICAL THERAPY INITIAL EVALUATION ADULT - GAIT DEVIATIONS NOTED, PT EVAL
decreased marco/decreased velocity of limb motion/decreased weight-shifting ability/decreased step length/slightly unsteady gait however no LOB/knee buckling noted; increased time required with turning; min assist to negotiate RW through hallway obstacles; **SMALL BOUTS OF FECAL INCONTINENCE IN HALLWAY

## 2019-09-18 NOTE — PHYSICAL THERAPY INITIAL EVALUATION ADULT - THERAPY FREQUENCY, PT EVAL
2-3x/week/Patient educated on frequency of inpatient therapy at [facility], patient verbalized understanding.

## 2019-09-18 NOTE — PHYSICAL THERAPY INITIAL EVALUATION ADULT - GENERAL OBSERVATIONS, REHAB EVAL
Chart reviewed. IE Completed. Patient without complaints of pain at rest, agreeable to PT. Patient received semi-supine, NAD, +tele, +(R)IV hep lock, +anal ABD dressing, DULCE Gardiner (SICU) cleared patient for treatment session.

## 2019-09-18 NOTE — PHYSICAL THERAPY INITIAL EVALUATION ADULT - PERTINENT HX OF CURRENT PROBLEM, REHAB EVAL
75 y/o male w/ PMH of pre-diabetes, HTN, HLD, BPH, who presented to [facility] ED w/ constant non-radiating anal pain over the five days with subjective fever and chills. He also states that he has had perianal itching over the past 5 weeks which was treated w/ preparation H by his outpatient doctor. Please refer to H&P on [place] for remaining.

## 2019-09-18 NOTE — PHYSICAL THERAPY INITIAL EVALUATION ADULT - PHYSICAL ASSIST/NONPHYSICAL ASSIST: SUPINE/SIT, REHAB EVAL
from (R) side-lying: able to bring B/L LEs off EOB with VCs; increased assist for trunk mobility/1 person assist/verbal cues/nonverbal cues (demo/gestures)

## 2019-09-19 LAB
-  AMOXICILLIN/CLAVULANIC ACID: SIGNIFICANT CHANGE UP
-  ERTAPENEM: SIGNIFICANT CHANGE UP
-  LEVOFLOXACIN: SIGNIFICANT CHANGE UP
-  METRONIDAZOLE: SIGNIFICANT CHANGE UP
-  PIPERACILLIN/TAZOBACTAM: SIGNIFICANT CHANGE UP
ANION GAP SERPL CALC-SCNC: 9 MMOL/L — SIGNIFICANT CHANGE UP (ref 5–17)
APPEARANCE UR: CLEAR — SIGNIFICANT CHANGE UP
BILIRUB UR-MCNC: ABNORMAL
BUN SERPL-MCNC: 23 MG/DL — SIGNIFICANT CHANGE UP (ref 7–23)
CALCIUM SERPL-MCNC: 8.2 MG/DL — LOW (ref 8.4–10.5)
CHLORIDE SERPL-SCNC: 103 MMOL/L — SIGNIFICANT CHANGE UP (ref 96–108)
CO2 SERPL-SCNC: 25 MMOL/L — SIGNIFICANT CHANGE UP (ref 22–31)
COLOR SPEC: YELLOW — SIGNIFICANT CHANGE UP
CREAT SERPL-MCNC: 1.1 MG/DL — SIGNIFICANT CHANGE UP (ref 0.5–1.3)
CULTURE RESULTS: SIGNIFICANT CHANGE UP
DIFF PNL FLD: ABNORMAL
GLUCOSE BLDC GLUCOMTR-MCNC: 109 MG/DL — HIGH (ref 70–99)
GLUCOSE BLDC GLUCOMTR-MCNC: 115 MG/DL — HIGH (ref 70–99)
GLUCOSE BLDC GLUCOMTR-MCNC: 134 MG/DL — HIGH (ref 70–99)
GLUCOSE BLDC GLUCOMTR-MCNC: 156 MG/DL — HIGH (ref 70–99)
GLUCOSE SERPL-MCNC: 143 MG/DL — HIGH (ref 70–99)
GLUCOSE UR QL: NEGATIVE — SIGNIFICANT CHANGE UP
HCT VFR BLD CALC: 41 % — SIGNIFICANT CHANGE UP (ref 39–50)
HGB BLD-MCNC: 12.7 G/DL — LOW (ref 13–17)
KETONES UR-MCNC: NEGATIVE — SIGNIFICANT CHANGE UP
LEUKOCYTE ESTERASE UR-ACNC: NEGATIVE — SIGNIFICANT CHANGE UP
MAGNESIUM SERPL-MCNC: 2 MG/DL — SIGNIFICANT CHANGE UP (ref 1.6–2.6)
MCHC RBC-ENTMCNC: 28.5 PG — SIGNIFICANT CHANGE UP (ref 27–34)
MCHC RBC-ENTMCNC: 31 GM/DL — LOW (ref 32–36)
MCV RBC AUTO: 92.1 FL — SIGNIFICANT CHANGE UP (ref 80–100)
METHOD TYPE: SIGNIFICANT CHANGE UP
NITRITE UR-MCNC: NEGATIVE — SIGNIFICANT CHANGE UP
NRBC # BLD: 0 /100 WBCS — SIGNIFICANT CHANGE UP (ref 0–0)
ORGANISM # SPEC MICROSCOPIC CNT: SIGNIFICANT CHANGE UP
PH UR: 6 — SIGNIFICANT CHANGE UP (ref 5–8)
PHOSPHATE SERPL-MCNC: 2.9 MG/DL — SIGNIFICANT CHANGE UP (ref 2.5–4.5)
PLATELET # BLD AUTO: 369 K/UL — SIGNIFICANT CHANGE UP (ref 150–400)
POTASSIUM SERPL-MCNC: 4.2 MMOL/L — SIGNIFICANT CHANGE UP (ref 3.5–5.3)
POTASSIUM SERPL-SCNC: 4.2 MMOL/L — SIGNIFICANT CHANGE UP (ref 3.5–5.3)
PROT UR-MCNC: ABNORMAL MG/DL
RBC # BLD: 4.45 M/UL — SIGNIFICANT CHANGE UP (ref 4.2–5.8)
RBC # FLD: 13 % — SIGNIFICANT CHANGE UP (ref 10.3–14.5)
SODIUM SERPL-SCNC: 137 MMOL/L — SIGNIFICANT CHANGE UP (ref 135–145)
SP GR SPEC: >=1.03 — SIGNIFICANT CHANGE UP (ref 1–1.03)
SPECIMEN SOURCE: SIGNIFICANT CHANGE UP
UROBILINOGEN FLD QL: 1 E.U./DL — SIGNIFICANT CHANGE UP
WBC # BLD: 10.11 K/UL — SIGNIFICANT CHANGE UP (ref 3.8–10.5)
WBC # FLD AUTO: 10.11 K/UL — SIGNIFICANT CHANGE UP (ref 3.8–10.5)

## 2019-09-19 RX ORDER — VANCOMYCIN HCL 1 G
1250 VIAL (EA) INTRAVENOUS EVERY 12 HOURS
Refills: 0 | Status: DISCONTINUED | OUTPATIENT
Start: 2019-09-19 | End: 2019-09-19

## 2019-09-19 RX ORDER — PIPERACILLIN AND TAZOBACTAM 4; .5 G/20ML; G/20ML
3.38 INJECTION, POWDER, LYOPHILIZED, FOR SOLUTION INTRAVENOUS EVERY 6 HOURS
Refills: 0 | Status: DISCONTINUED | OUTPATIENT
Start: 2019-09-19 | End: 2019-09-19

## 2019-09-19 RX ORDER — KETOROLAC TROMETHAMINE 30 MG/ML
15 SYRINGE (ML) INJECTION EVERY 8 HOURS
Refills: 0 | Status: DISCONTINUED | OUTPATIENT
Start: 2019-09-19 | End: 2019-09-22

## 2019-09-19 RX ORDER — METRONIDAZOLE 500 MG
500 TABLET ORAL EVERY 8 HOURS
Refills: 0 | Status: DISCONTINUED | OUTPATIENT
Start: 2019-09-19 | End: 2019-09-22

## 2019-09-19 RX ORDER — CEFTRIAXONE 500 MG/1
1000 INJECTION, POWDER, FOR SOLUTION INTRAMUSCULAR; INTRAVENOUS EVERY 24 HOURS
Refills: 0 | Status: DISCONTINUED | OUTPATIENT
Start: 2019-09-19 | End: 2019-09-22

## 2019-09-19 RX ADMIN — AMLODIPINE BESYLATE 5 MILLIGRAM(S): 2.5 TABLET ORAL at 05:36

## 2019-09-19 RX ADMIN — CHLORHEXIDINE GLUCONATE 1 APPLICATION(S): 213 SOLUTION TOPICAL at 05:36

## 2019-09-19 RX ADMIN — Medication 15 MILLIGRAM(S): at 14:45

## 2019-09-19 RX ADMIN — CEFTRIAXONE 100 MILLIGRAM(S): 500 INJECTION, POWDER, FOR SOLUTION INTRAMUSCULAR; INTRAVENOUS at 11:17

## 2019-09-19 RX ADMIN — Medication 166.67 MILLIGRAM(S): at 04:23

## 2019-09-19 RX ADMIN — Medication 100 MILLIGRAM(S): at 22:49

## 2019-09-19 RX ADMIN — Medication 15 MILLIGRAM(S): at 15:12

## 2019-09-19 RX ADMIN — Medication 100 MILLIGRAM(S): at 14:46

## 2019-09-19 RX ADMIN — TAMSULOSIN HYDROCHLORIDE 0.4 MILLIGRAM(S): 0.4 CAPSULE ORAL at 22:49

## 2019-09-19 RX ADMIN — ENOXAPARIN SODIUM 40 MILLIGRAM(S): 100 INJECTION SUBCUTANEOUS at 11:17

## 2019-09-19 RX ADMIN — HYDROMORPHONE HYDROCHLORIDE 0.5 MILLIGRAM(S): 2 INJECTION INTRAMUSCULAR; INTRAVENOUS; SUBCUTANEOUS at 07:31

## 2019-09-19 RX ADMIN — Medication 15 MILLIGRAM(S): at 23:05

## 2019-09-19 RX ADMIN — Medication 15 MILLIGRAM(S): at 22:50

## 2019-09-19 RX ADMIN — PIPERACILLIN AND TAZOBACTAM 200 GRAM(S): 4; .5 INJECTION, POWDER, LYOPHILIZED, FOR SOLUTION INTRAVENOUS at 05:36

## 2019-09-19 RX ADMIN — PIPERACILLIN AND TAZOBACTAM 200 GRAM(S): 4; .5 INJECTION, POWDER, LYOPHILIZED, FOR SOLUTION INTRAVENOUS at 01:32

## 2019-09-19 RX ADMIN — Medication 2: at 12:31

## 2019-09-19 NOTE — PROVIDER CONTACT NOTE (OTHER) - ACTION/TREATMENT ORDERED:
Only cover with ABD pad per SICU team and continue to monitor.
No interventions ordered, MD Fish will discuss sending c. diff sample with team in AM.

## 2019-09-19 NOTE — PROVIDER CONTACT NOTE (OTHER) - ASSESSMENT
Hypertensive with SBP up to185 - MD notified. Vitals otherwise stable. No rectal or abdominal pain reported.

## 2019-09-20 LAB
ANION GAP SERPL CALC-SCNC: 9 MMOL/L — SIGNIFICANT CHANGE UP (ref 5–17)
BUN SERPL-MCNC: 26 MG/DL — HIGH (ref 7–23)
CALCIUM SERPL-MCNC: 8.3 MG/DL — LOW (ref 8.4–10.5)
CHLORIDE SERPL-SCNC: 107 MMOL/L — SIGNIFICANT CHANGE UP (ref 96–108)
CO2 SERPL-SCNC: 24 MMOL/L — SIGNIFICANT CHANGE UP (ref 22–31)
CREAT SERPL-MCNC: 1.09 MG/DL — SIGNIFICANT CHANGE UP (ref 0.5–1.3)
GLUCOSE BLDC GLUCOMTR-MCNC: 105 MG/DL — HIGH (ref 70–99)
GLUCOSE BLDC GLUCOMTR-MCNC: 127 MG/DL — HIGH (ref 70–99)
GLUCOSE BLDC GLUCOMTR-MCNC: 159 MG/DL — HIGH (ref 70–99)
GLUCOSE BLDC GLUCOMTR-MCNC: 97 MG/DL — SIGNIFICANT CHANGE UP (ref 70–99)
GLUCOSE SERPL-MCNC: 110 MG/DL — HIGH (ref 70–99)
MAGNESIUM SERPL-MCNC: 2 MG/DL — SIGNIFICANT CHANGE UP (ref 1.6–2.6)
PHOSPHATE SERPL-MCNC: 2.9 MG/DL — SIGNIFICANT CHANGE UP (ref 2.5–4.5)
POTASSIUM SERPL-MCNC: 4.3 MMOL/L — SIGNIFICANT CHANGE UP (ref 3.5–5.3)
POTASSIUM SERPL-SCNC: 4.3 MMOL/L — SIGNIFICANT CHANGE UP (ref 3.5–5.3)
SODIUM SERPL-SCNC: 140 MMOL/L — SIGNIFICANT CHANGE UP (ref 135–145)

## 2019-09-20 RX ORDER — LOSARTAN POTASSIUM 100 MG/1
50 TABLET, FILM COATED ORAL DAILY
Refills: 0 | Status: DISCONTINUED | OUTPATIENT
Start: 2019-09-20 | End: 2019-09-22

## 2019-09-20 RX ORDER — LANOLIN ALCOHOL/MO/W.PET/CERES
5 CREAM (GRAM) TOPICAL AT BEDTIME
Refills: 0 | Status: DISCONTINUED | OUTPATIENT
Start: 2019-09-20 | End: 2019-09-22

## 2019-09-20 RX ADMIN — Medication 15 MILLIGRAM(S): at 14:55

## 2019-09-20 RX ADMIN — Medication 100 MILLIGRAM(S): at 14:40

## 2019-09-20 RX ADMIN — Medication 15 MILLIGRAM(S): at 06:10

## 2019-09-20 RX ADMIN — Medication 15 MILLIGRAM(S): at 22:20

## 2019-09-20 RX ADMIN — Medication 100 MILLIGRAM(S): at 06:10

## 2019-09-20 RX ADMIN — TAMSULOSIN HYDROCHLORIDE 0.4 MILLIGRAM(S): 0.4 CAPSULE ORAL at 22:05

## 2019-09-20 RX ADMIN — Medication 5 MILLIGRAM(S): at 23:14

## 2019-09-20 RX ADMIN — LOSARTAN POTASSIUM 50 MILLIGRAM(S): 100 TABLET, FILM COATED ORAL at 14:40

## 2019-09-20 RX ADMIN — Medication 15 MILLIGRAM(S): at 14:40

## 2019-09-20 RX ADMIN — CEFTRIAXONE 100 MILLIGRAM(S): 500 INJECTION, POWDER, FOR SOLUTION INTRAMUSCULAR; INTRAVENOUS at 11:09

## 2019-09-20 RX ADMIN — Medication 2: at 12:46

## 2019-09-20 RX ADMIN — AMLODIPINE BESYLATE 5 MILLIGRAM(S): 2.5 TABLET ORAL at 06:09

## 2019-09-20 RX ADMIN — Medication 100 MILLIGRAM(S): at 22:05

## 2019-09-20 RX ADMIN — ENOXAPARIN SODIUM 40 MILLIGRAM(S): 100 INJECTION SUBCUTANEOUS at 12:48

## 2019-09-20 RX ADMIN — Medication 15 MILLIGRAM(S): at 06:25

## 2019-09-20 RX ADMIN — Medication 15 MILLIGRAM(S): at 22:05

## 2019-09-20 NOTE — CONSULT NOTE ADULT - SUBJECTIVE AND OBJECTIVE BOX
HPI:  73 y/o male w/ PMH of pre-diabetes, HTN, HLD, BPH, who presented to [facility] ED w/ constant non-radiating anal pain over the five days with subjective fever and chills. He also states that he has had perianal itching over the past 5 weeks which was treated w/ preparation H by his outpatient doctor. He was seen at [facility] yesterday and treated with Bactrim and Ibuprofen, which was insufficient in alleviating his pain. He has no change in bowel movements, no diarrhea, or blood in his stool. He denies N/V, abdominal pain, dysuria, chest pain and SOB. CT performed in the ED showed a large right perianal abscess with fistulous connection to the anus posteriorly as well as extension into the right perineum, buttock and right leg. The patient has been admitted to [facility] for further management. (16 Sep 2019 02:16)      PAST MEDICAL & SURGICAL HISTORY:  HLD (hyperlipidemia)  HTN (hypertension)  No significant past surgical history      MEDICATIONS  (STANDING):  amLODIPine   Tablet 5 milliGRAM(s) Oral daily  cefTRIAXone   IVPB 1000 milliGRAM(s) IV Intermittent every 24 hours  chlorhexidine 2% Cloths 1 Application(s) Topical <User Schedule>  enoxaparin Injectable 40 milliGRAM(s) SubCutaneous every 24 hours  insulin lispro (HumaLOG) corrective regimen sliding scale   SubCutaneous every 6 hours  ketorolac   Injectable 15 milliGRAM(s) IV Push every 8 hours  metroNIDAZOLE  IVPB 500 milliGRAM(s) IV Intermittent every 8 hours  tamsulosin 0.4 milliGRAM(s) Oral at bedtime    MEDICATIONS  (PRN):  HYDROmorphone  Injectable 0.5 milliGRAM(s) IV Push every 4 hours PRN Breakthrough pain  oxyCODONE    5 mG/acetaminophen 325 mG 1 Tablet(s) Oral every 4 hours PRN Moderate Pain (4 - 6)  oxyCODONE    5 mG/acetaminophen 325 mG 2 Tablet(s) Oral every 4 hours PRN Severe Pain (7 - 10)      Allergies    No Known Allergies    Intolerances        SOCIAL HISTORY:    FAMILY HISTORY:      Vital Signs Last 24 Hrs  T(C): 36.8 (20 Sep 2019 05:58), Max: 37 (19 Sep 2019 10:00)  T(F): 98.3 (20 Sep 2019 05:58), Max: 98.6 (19 Sep 2019 10:00)  HR: 67 (20 Sep 2019 05:58) (62 - 78)  BP: 165/79 (20 Sep 2019 05:58) (139/63 - 169/77)  BP(mean): 107 (19 Sep 2019 15:40) (91 - 107)  RR: 19 (20 Sep 2019 05:58) (17 - 163)  SpO2: 95% (20 Sep 2019 05:58) (93% - 97%)    On PE:  General:  Abdomen:    : Dumont catheter in place. Mild scrotal swelling noted. On palpation, swelling is limited to the scrotal skin, testicles were easily palpated and were not noted to be enlarged. No erythema, induration or fluctuance noted in scrotal or perineal area. Left testicle tender to palpation.  ABD: soft, non-tender, non-distended  EXT: non edematous    LABS:                        12.7   10.11 )-----------( 369      ( 19 Sep 2019 05:49 )             41.0     09-20    140  |  107  |  26<H>  ----------------------------<  110<H>  4.3   |  24  |  1.09    Ca    8.3<L>      20 Sep 2019 06:13  Phos  2.9     09-20  Mg     2.0     09-20        Urinalysis Basic - ( 19 Sep 2019 21:53 )    Color: Yellow / Appearance: Clear / SG: >=1.030 / pH: x  Gluc: x / Ketone: NEGATIVE  / Bili: Small / Urobili: 1.0 E.U./dL   Blood: x / Protein: Trace mg/dL / Nitrite: NEGATIVE   Leuk Esterase: NEGATIVE / RBC: Many /HPF / WBC < 5 /HPF   Sq Epi: x / Non Sq Epi: 0-5 /HPF / Bacteria: Present /HPF        RADIOLOGY & ADDITIONAL STUDIES:

## 2019-09-20 NOTE — CONSULT NOTE ADULT - ASSESSMENT
73 yo male with R perirectal/perianal abscess r/o Fourniers gangrene, consulted for scrotal swelling    -Scrotal elevation  -U/S if condition worsens  -Urology signing off, please re-consult if needed

## 2019-09-20 NOTE — CHART NOTE - NSCHARTNOTEFT_GEN_A_CORE
Admitting Diagnosis:   Patient is a 74y old  Male who presents with a chief complaint of     PAST MEDICAL & SURGICAL HISTORY:  HLD (hyperlipidemia)  HTN (hypertension)  No significant past surgical history      Current Nutrition Order: low fiber     PO Intake: Varying, % of meal tray     GI Issues: Denies N/V, previously w/ loose stool, however last stool 9/19 more formed     Pain: Not reporting pain at this time     Skin Integrity: 1+ edema L and R legs, L and R arms, 2+ edema scrotum 9/19, +surgical incision    Labs: POCT: 9/20: 105-127, 9/19: 109-156, 9/18: 118-155  09-20    140  |  107  |  26<H>  ----------------------------<  110<H>  4.3   |  24  |  1.09    Ca    8.3<L>      20 Sep 2019 06:13  Phos  2.9     09-20  Mg     2.0     09-20      CAPILLARY BLOOD GLUCOSE      POCT Blood Glucose.: 159 mg/dL (20 Sep 2019 11:45)  POCT Blood Glucose.: 105 mg/dL (20 Sep 2019 07:38)  POCT Blood Glucose.: 127 mg/dL (20 Sep 2019 00:26)  POCT Blood Glucose.: 109 mg/dL (19 Sep 2019 17:18)      Medications:  MEDICATIONS  (STANDING):  cefTRIAXone   IVPB 1000 milliGRAM(s) IV Intermittent every 24 hours  enoxaparin Injectable 40 milliGRAM(s) SubCutaneous every 24 hours  insulin lispro (HumaLOG) corrective regimen sliding scale   SubCutaneous every 6 hours  ketorolac   Injectable 15 milliGRAM(s) IV Push every 8 hours  losartan 50 milliGRAM(s) Oral daily  metroNIDAZOLE  IVPB 500 milliGRAM(s) IV Intermittent every 8 hours  tamsulosin 0.4 milliGRAM(s) Oral at bedtime    MEDICATIONS  (PRN):  melatonin 5 milliGRAM(s) Oral at bedtime PRN Insomnia  oxyCODONE    5 mG/acetaminophen 325 mG 1 Tablet(s) Oral every 4 hours PRN Moderate Pain (4 - 6)  oxyCODONE    5 mG/acetaminophen 325 mG 2 Tablet(s) Oral every 4 hours PRN Severe Pain (7 - 10)      Weight:  Admit wt- 77.1kg  9/16- 77.1kg  9/18- 77.5kg    Weight Change: Wt appears fairly stable    Nutrition Focused Physical Exam: Completed [   ]  Not Pertinent [ x  ]    Estimated energy needs:   Height 66"; #; #; 120%IBW  BMI 27.5  ActualBW used for calculations as pt between % of IBW. Needs estimated for maintenance in older adults; increased for post-op/wound healing  25-30kcal/kg= 1927-2313kcal  1.2-1.4gms pro/kg= 77-108gms pro  30-35ml/kg= 2313-2689ml    Subjective:   Pt seen for nutrition follow up. 75 yo/male with PMHx pre-DM, HTN, HLD, BPH presented w/anal pain, fever, and chills. CT scan showing large R. perianal abscess w/fistulous connection to anus posteriorly and extension into R. perineum, buttock, and R. leg. S/p I&D of abscess and fistula repair on 9/16. Course c/b hypotension/septic shock (now resolved) requiring intubation, now extubated 9/17. Pt seen resting in bed, awake, alert. Pt is on low fiber diet w/ varying PO intake 2/2 decreased appetite, consuming % of meal tray, although reports appetite is improving and that he is forcing himself to eat. Previously w/ loose stool, now improved. Diet education provided. Nutrition recs left below. RD to follow up per protocol.     Previous Nutrition Diagnosis:  Inadequate Energy Intake RT current NPO status AEB 0% of EER being met at this time.     Active [   ]  Resolved [ x  ]    If resolved, new PES:   Increased nutrient needs RT condition that warrants increased demand for nutrients AEB post op/wound healing    Goal: Pt to consume >75% EER consistently     Recommendations:  1. recommend change diet to low fiber w/ cstCHO no snack for improved glucose control  2. recommend add Glucerna x1/day (220kcal and 10gms pro)- pt request  3. bowel regimen per team discretion  4. diet education provided  5. weekly wts    Education: Encouraged adequate PO intake, discussed low fiber diet, discussed purpose of ONS, encouraged consumption of ONS in between meals.    Risk Level: High [   ] Moderate [ x  ] Low [   ]

## 2019-09-21 LAB
ANION GAP SERPL CALC-SCNC: 11 MMOL/L — SIGNIFICANT CHANGE UP (ref 5–17)
BUN SERPL-MCNC: 22 MG/DL — SIGNIFICANT CHANGE UP (ref 7–23)
C TRACH RRNA SPEC QL NAA+PROBE: SIGNIFICANT CHANGE UP
CALCIUM SERPL-MCNC: 8.7 MG/DL — SIGNIFICANT CHANGE UP (ref 8.4–10.5)
CHLORIDE SERPL-SCNC: 104 MMOL/L — SIGNIFICANT CHANGE UP (ref 96–108)
CO2 SERPL-SCNC: 27 MMOL/L — SIGNIFICANT CHANGE UP (ref 22–31)
CREAT SERPL-MCNC: 0.97 MG/DL — SIGNIFICANT CHANGE UP (ref 0.5–1.3)
CULTURE RESULTS: SIGNIFICANT CHANGE UP
CULTURE RESULTS: SIGNIFICANT CHANGE UP
GLUCOSE SERPL-MCNC: 121 MG/DL — HIGH (ref 70–99)
MAGNESIUM SERPL-MCNC: 2 MG/DL — SIGNIFICANT CHANGE UP (ref 1.6–2.6)
N GONORRHOEA RRNA SPEC QL NAA+PROBE: SIGNIFICANT CHANGE UP
PHOSPHATE SERPL-MCNC: 3.2 MG/DL — SIGNIFICANT CHANGE UP (ref 2.5–4.5)
POTASSIUM SERPL-MCNC: 4.5 MMOL/L — SIGNIFICANT CHANGE UP (ref 3.5–5.3)
POTASSIUM SERPL-SCNC: 4.5 MMOL/L — SIGNIFICANT CHANGE UP (ref 3.5–5.3)
SODIUM SERPL-SCNC: 142 MMOL/L — SIGNIFICANT CHANGE UP (ref 135–145)
SPECIMEN SOURCE: SIGNIFICANT CHANGE UP

## 2019-09-21 RX ADMIN — TAMSULOSIN HYDROCHLORIDE 0.4 MILLIGRAM(S): 0.4 CAPSULE ORAL at 21:37

## 2019-09-21 RX ADMIN — CEFTRIAXONE 100 MILLIGRAM(S): 500 INJECTION, POWDER, FOR SOLUTION INTRAMUSCULAR; INTRAVENOUS at 11:52

## 2019-09-21 RX ADMIN — Medication 15 MILLIGRAM(S): at 14:24

## 2019-09-21 RX ADMIN — Medication 15 MILLIGRAM(S): at 14:09

## 2019-09-21 RX ADMIN — Medication 100 MILLIGRAM(S): at 05:04

## 2019-09-21 RX ADMIN — Medication 15 MILLIGRAM(S): at 05:03

## 2019-09-21 RX ADMIN — Medication 100 MILLIGRAM(S): at 14:09

## 2019-09-21 RX ADMIN — Medication 100 MILLIGRAM(S): at 21:37

## 2019-09-21 RX ADMIN — ENOXAPARIN SODIUM 40 MILLIGRAM(S): 100 INJECTION SUBCUTANEOUS at 11:52

## 2019-09-21 RX ADMIN — LOSARTAN POTASSIUM 50 MILLIGRAM(S): 100 TABLET, FILM COATED ORAL at 05:04

## 2019-09-21 RX ADMIN — Medication 15 MILLIGRAM(S): at 21:37

## 2019-09-21 RX ADMIN — Medication 15 MILLIGRAM(S): at 21:59

## 2019-09-21 NOTE — PROGRESS NOTE ADULT - ATTENDING COMMENTS
Abdomen soft, BS+, Flatus+, BM+ multiple, wound was clean and dressing was change multiple times.  Patient needs to go home for wound management with sitz bath.
Patient seen and examined with house-staff during bedside rounds.  Resident note read, including vitals, physical findings, laboratory data, and radiological reports.   Revisions included below.  Direct personal management at bed side and extensive interpretation of the data.  Plan was outlined and discussed in details with the housestaff.  Decision making of high complexity  Action taken for acute disease activity to reflect the level of care provided:  - medication reconciliation  - review laboratory data  Patient had a successful weaning trial and was extubated    Continue antibiotic and discontinue clindamycin    Continue unchanged    He is hemodynamically stable    Patient has dysphasia evaluation    Advance diet    Discontinue arterial line and a Dumont.
Patient seen and examined with house-staff during bedside rounds.  Resident note read, including vitals, physical findings, laboratory data, and radiological reports.   Revisions included below.  Direct personal management at bed side and extensive interpretation of the data.  Plan was outlined and discussed in details with the housestaff.  Decision making of high complexity  Action taken for acute disease activity to reflect the level of care provided:  - medication reconciliation  - review laboratory data  hemodynamically stable  no change in CXR  wound changed  started on flomax  continue antibiotics  DC clindamycin and continue on the zosyn and vanco

## 2019-09-22 ENCOUNTER — TRANSCRIPTION ENCOUNTER (OUTPATIENT)
Age: 74
End: 2019-09-22

## 2019-09-22 VITALS
DIASTOLIC BLOOD PRESSURE: 90 MMHG | TEMPERATURE: 98 F | SYSTOLIC BLOOD PRESSURE: 171 MMHG | RESPIRATION RATE: 17 BRPM | OXYGEN SATURATION: 95 % | HEART RATE: 79 BPM

## 2019-09-22 LAB
ANION GAP SERPL CALC-SCNC: 8 MMOL/L — SIGNIFICANT CHANGE UP (ref 5–17)
APPEARANCE UR: CLEAR — SIGNIFICANT CHANGE UP
BILIRUB UR-MCNC: NEGATIVE — SIGNIFICANT CHANGE UP
BUN SERPL-MCNC: 18 MG/DL — SIGNIFICANT CHANGE UP (ref 7–23)
CALCIUM SERPL-MCNC: 8.4 MG/DL — SIGNIFICANT CHANGE UP (ref 8.4–10.5)
CHLORIDE SERPL-SCNC: 105 MMOL/L — SIGNIFICANT CHANGE UP (ref 96–108)
CO2 SERPL-SCNC: 26 MMOL/L — SIGNIFICANT CHANGE UP (ref 22–31)
COLOR SPEC: YELLOW — SIGNIFICANT CHANGE UP
CREAT SERPL-MCNC: 0.85 MG/DL — SIGNIFICANT CHANGE UP (ref 0.5–1.3)
DIFF PNL FLD: ABNORMAL
GLUCOSE SERPL-MCNC: 100 MG/DL — HIGH (ref 70–99)
GLUCOSE UR QL: NEGATIVE — SIGNIFICANT CHANGE UP
GRAM STN GENITAL: SIGNIFICANT CHANGE UP
KETONES UR-MCNC: NEGATIVE — SIGNIFICANT CHANGE UP
LEUKOCYTE ESTERASE UR-ACNC: NEGATIVE — SIGNIFICANT CHANGE UP
MAGNESIUM SERPL-MCNC: 1.8 MG/DL — SIGNIFICANT CHANGE UP (ref 1.6–2.6)
NITRITE UR-MCNC: NEGATIVE — SIGNIFICANT CHANGE UP
PH UR: 7.5 — SIGNIFICANT CHANGE UP (ref 5–8)
PHOSPHATE SERPL-MCNC: 3 MG/DL — SIGNIFICANT CHANGE UP (ref 2.5–4.5)
POTASSIUM SERPL-MCNC: 4 MMOL/L — SIGNIFICANT CHANGE UP (ref 3.5–5.3)
POTASSIUM SERPL-SCNC: 4 MMOL/L — SIGNIFICANT CHANGE UP (ref 3.5–5.3)
PROT UR-MCNC: NEGATIVE MG/DL — SIGNIFICANT CHANGE UP
SODIUM SERPL-SCNC: 139 MMOL/L — SIGNIFICANT CHANGE UP (ref 135–145)
SP GR SPEC: 1.01 — SIGNIFICANT CHANGE UP (ref 1–1.03)
UROBILINOGEN FLD QL: 0.2 E.U./DL — SIGNIFICANT CHANGE UP

## 2019-09-22 RX ORDER — METFORMIN HYDROCHLORIDE 850 MG/1
1 TABLET ORAL
Qty: 0 | Refills: 0 | DISCHARGE

## 2019-09-22 RX ADMIN — Medication 100 MILLIGRAM(S): at 05:34

## 2019-09-22 RX ADMIN — CEFTRIAXONE 100 MILLIGRAM(S): 500 INJECTION, POWDER, FOR SOLUTION INTRAMUSCULAR; INTRAVENOUS at 11:24

## 2019-09-22 RX ADMIN — Medication 100 MILLIGRAM(S): at 14:21

## 2019-09-22 RX ADMIN — LOSARTAN POTASSIUM 50 MILLIGRAM(S): 100 TABLET, FILM COATED ORAL at 05:34

## 2019-09-22 RX ADMIN — ENOXAPARIN SODIUM 40 MILLIGRAM(S): 100 INJECTION SUBCUTANEOUS at 11:24

## 2019-09-22 NOTE — PROGRESS NOTE ADULT - SUBJECTIVE AND OBJECTIVE BOX
SUBJECTIVE/Interval Events:  Intubated/off sedation at the time of exam; responsive; Afebrile, VSS, weaning pressors.    Vital Signs Last 24 Hrs  T(C): 36.8 (17 Sep 2019 14:25), Max: 36.8 (17 Sep 2019 14:25)  T(F): 98.3 (17 Sep 2019 14:25), Max: 98.3 (17 Sep 2019 14:25)  HR: 76 (17 Sep 2019 15:00) (50 - 78)  BP: 89/44 (17 Sep 2019 10:00) (89/44 - 123/65)  BP(mean): 58 (17 Sep 2019 10:00) (58 - 93)  RR: 18 (17 Sep 2019 15:00) (11 - 23)  SpO2: 98% (17 Sep 2019 15:00) (96% - 99%)    Physical Exam:  General: NAD  Pulmonary: Intubated, on vent  Cardiovascular: NSR  Abdominal: soft, NT/ND  GI: I&D incision on L buttock remains open, packing changed; foul smelling, no purulence    I&O's Summary  16 Sep 2019 07:01  -  17 Sep 2019 07:00  --------------------------------------------------------  IN: 3172.8 mL / OUT: 1610 mL / NET: 1562.8 mL    17 Sep 2019 07:01  -  17 Sep 2019 15:38  --------------------------------------------------------  IN: 890 mL / OUT: 385 mL / NET: 505 mL    LABS:                     11.7   23.95 )-----------( 372      ( 17 Sep 2019 05:57 )             36.6     09-17  137  |  107  |  32<H>  ----------------------------<  240<H>  5.2   |  19<L>  |  1.24    Ca    7.7<L>      17 Sep 2019 05:57  Phos  3.5     09-17  Mg     2.8     09-17    TPro  5.0<L>  /  Alb  2.4<L>  /  TBili  0.9  /  DBili  x   /  AST  37  /  ALT  51<H>  /  AlkPhos  75  09-16    PT/INR - ( 16 Sep 2019 09:57 )   PT: 16.7 sec;   INR: 1.46     PTT - ( 16 Sep 2019 09:57 )  PTT:30.0 sec    Urinalysis Basic - ( 16 Sep 2019 04:25 )  Color: Yellow / Appearance: Clear / SG: <=1.005 / pH: x  Gluc: x / Ketone: NEGATIVE  / Bili: Negative / Urobili: 0.2 E.U./dL   Blood: x / Protein: NEGATIVE mg/dL / Nitrite: NEGATIVE   Leuk Esterase: NEGATIVE / RBC: x / WBC x   Sq Epi: x / Non Sq Epi: x / Bacteria: x    CAPILLARY BLOOD GLUCOSE  POCT Blood Glucose.: 163 mg/dL (17 Sep 2019 10:41)  POCT Blood Glucose.: 226 mg/dL (17 Sep 2019 05:52)  POCT Blood Glucose.: 201 mg/dL (16 Sep 2019 23:15)  POCT Blood Glucose.: 213 mg/dL (16 Sep 2019 16:46)    LIVER FUNCTIONS - ( 16 Sep 2019 09:57 )  Alb: 2.4 g/dL / Pro: 5.0 g/dL / ALK PHOS: 75 U/L / ALT: 51 U/L / AST: 37 U/L / GGT: x
24 hr events: Extubated and off pressor support.    SUBJECTIVE: Patient without complaints    MEDICATIONS  (STANDING):  chlorhexidine 0.12% Liquid 15 milliLiter(s) Oral Mucosa two times a day  chlorhexidine 2% Cloths 1 Application(s) Topical <User Schedule>  clindamycin IVPB 900 milliGRAM(s) IV Intermittent every 8 hours  enoxaparin Injectable 40 milliGRAM(s) SubCutaneous every 24 hours  insulin lispro (HumaLOG) corrective regimen sliding scale   SubCutaneous every 6 hours  pantoprazole  Injectable 40 milliGRAM(s) IV Push daily  piperacillin/tazobactam IVPB.. 3.375 Gram(s) IV Intermittent every 6 hours  sodium chloride 0.9%. 1000 milliLiter(s) (110 mL/Hr) IV Continuous <Continuous>  vancomycin  IVPB 1250 milliGRAM(s) IV Intermittent every 12 hours    MEDICATIONS  (PRN):  HYDROmorphone  Injectable 1 milliGRAM(s) IV Push every 4 hours PRN Severe Pain (7 - 10)  HYDROmorphone  Injectable 0.5 milliGRAM(s) IV Push every 4 hours PRN Moderate Pain (4 - 6)      ICU Vital Signs Last 24 Hrs  T(C): 36.8 (17 Sep 2019 14:25), Max: 36.8 (17 Sep 2019 14:25)  T(F): 98.3 (17 Sep 2019 14:25), Max: 98.3 (17 Sep 2019 14:25)  HR: 76 (17 Sep 2019 15:00) (50 - 78)  BP: 89/44 (17 Sep 2019 10:00) (89/44 - 123/65)  BP(mean): 58 (17 Sep 2019 10:00) (58 - 93)  ABP: 104/56 (17 Sep 2019 15:00) (92/46 - 150/76)  ABP(mean): 76 (17 Sep 2019 15:00) (62 - 102)  RR: 18 (17 Sep 2019 15:00) (11 - 23)  SpO2: 98% (17 Sep 2019 15:00) (96% - 99%)      Physical Exam:  General: NAD  HEENT: NC/AT  Pulmonary: Nonlabored breathing, CTAB  Abdominal: Soft, nontender, distended  Rectal: Anal skin tags, incision with clean wound without pus drainage  : Dumont draining yellow urine  Neuro: Grossly intact    Vent settings:  Mode: Spontaneous/ CPAP (Continuous Positive Airway Pressure), FiO2: 40, PEEP: 5, PS: 10, PIP: 15    I&O's Summary    16 Sep 2019 07:01  -  17 Sep 2019 07:00  --------------------------------------------------------  IN: 3172.8 mL / OUT: 1610 mL / NET: 1562.8 mL    17 Sep 2019 07:01  -  17 Sep 2019 15:20  --------------------------------------------------------  IN: 890 mL / OUT: 385 mL / NET: 505 mL        LABS:                        11.7   23.95 )-----------( 372      ( 17 Sep 2019 05:57 )             36.6     09-17    137  |  107  |  32<H>  ----------------------------<  240<H>  5.2   |  19<L>  |  1.24    Ca    7.7<L>      17 Sep 2019 05:57  Phos  3.5     09-17  Mg     2.8     09-17    TPro  5.0<L>  /  Alb  2.4<L>  /  TBili  0.9  /  DBili  x   /  AST  37  /  ALT  51<H>  /  AlkPhos  75  09-16    PT/INR - ( 16 Sep 2019 09:57 )   PT: 16.7 sec;   INR: 1.46          PTT - ( 16 Sep 2019 09:57 )  PTT:30.0 sec  Urinalysis Basic - ( 16 Sep 2019 04:25 )    Color: Yellow / Appearance: Clear / SG: <=1.005 / pH: x  Gluc: x / Ketone: NEGATIVE  / Bili: Negative / Urobili: 0.2 E.U./dL   Blood: x / Protein: NEGATIVE mg/dL / Nitrite: NEGATIVE   Leuk Esterase: NEGATIVE / RBC: x / WBC x   Sq Epi: x / Non Sq Epi: x / Bacteria: x      CAPILLARY BLOOD GLUCOSE      POCT Blood Glucose.: 163 mg/dL (17 Sep 2019 10:41)  POCT Blood Glucose.: 226 mg/dL (17 Sep 2019 05:52)  POCT Blood Glucose.: 201 mg/dL (16 Sep 2019 23:15)  POCT Blood Glucose.: 213 mg/dL (16 Sep 2019 16:46)    LIVER FUNCTIONS - ( 16 Sep 2019 09:57 )  Alb: 2.4 g/dL / Pro: 5.0 g/dL / ALK PHOS: 75 U/L / ALT: 51 U/L / AST: 37 U/L / GGT: x             Cultures:Culture Results:   Moderate Escherichia coli  Susceptibility to follow.  Moderate Streptococcus intermedius  Susceptibility to follow. (09-16 @ 10:36)  Culture Results:   No growth to date. (09-16 @ 01:22)  Culture Results:   No growth to date. (09-16 @ 01:22)
INTERVAL HPI/OVERNIGHT EVENTS:   SURGERY ATTENDING for Dr. Alegre    STATUS POST:  Excision and debridment of perirectal abscess    POST OPERATIVE DAY #:     SUBJECTIVE:  Flatus: [x ] YES [ ] NO             Bowel Movement: [x ] YES [ ] NO  Pain (0-10):        1    Pain Control Adequate: [x ] YES [ ] NO  Nausea: [ ] YES [x ] NO            Vomiting: [ ] YES [x ] NO  Diarrhea: [ ] YES [x ] NO         Constipation: [ ] YES [x ] NO     Chest Pain: [ ] YES [x ] NO    SOB:  [ ] YES [x ] NO    MEDICATIONS  (STANDING):  cefTRIAXone   IVPB 1000 milliGRAM(s) IV Intermittent every 24 hours  enoxaparin Injectable 40 milliGRAM(s) SubCutaneous every 24 hours  ketorolac   Injectable 15 milliGRAM(s) IV Push every 8 hours  losartan 50 milliGRAM(s) Oral daily  metroNIDAZOLE  IVPB 500 milliGRAM(s) IV Intermittent every 8 hours  tamsulosin 0.4 milliGRAM(s) Oral at bedtime    MEDICATIONS  (PRN):  melatonin 5 milliGRAM(s) Oral at bedtime PRN Insomnia  oxyCODONE    5 mG/acetaminophen 325 mG 1 Tablet(s) Oral every 4 hours PRN Moderate Pain (4 - 6)  oxyCODONE    5 mG/acetaminophen 325 mG 2 Tablet(s) Oral every 4 hours PRN Severe Pain (7 - 10)      Vital Signs Last 24 Hrs  T(C): 36.7 (21 Sep 2019 21:06), Max: 36.7 (21 Sep 2019 21:06)  T(F): 98 (21 Sep 2019 21:06), Max: 98 (21 Sep 2019 21:06)  HR: 74 (21 Sep 2019 21:06) (63 - 77)  BP: 176/81 (21 Sep 2019 21:06) (163/84 - 176/81)  BP(mean): --  RR: 18 (21 Sep 2019 21:06) (17 - 18)  SpO2: 98% (21 Sep 2019 21:06) (94% - 98%)    PHYSICAL EXAM:      Constitutional:    Eyes:    ENMT:    Neck:    Breasts:    Back:    Respiratory:    Cardiovascular:    Gastrointestinal:    Genitourinary:    Rectal:    Extremities:    Vascular:    Neurological:    Skin:    Lymph Nodes:    Musculoskeletal:    Psychiatric:        I&O's Detail    20 Sep 2019 07:01  -  21 Sep 2019 07:00  --------------------------------------------------------  IN:    IV PiggyBack: 150 mL    Oral Fluid: 1100 mL  Total IN: 1250 mL    OUT:    Indwelling Catheter - Urethral: 1500 mL    Voided: 550 mL  Total OUT: 2050 mL    Total NET: -800 mL      21 Sep 2019 07:01  -  21 Sep 2019 21:17  --------------------------------------------------------  IN:    Oral Fluid: 1030 mL  Total IN: 1030 mL    OUT:    Indwelling Catheter - Urethral: 400 mL    Stool: 2 mL    Voided: 625 mL  Total OUT: 1027 mL    Total NET: 3 mL          LABS:    09-21    142  |  104  |  22  ----------------------------<  121<H>  4.5   |  27  |  0.97    Ca    8.7      21 Sep 2019 06:52  Phos  3.2     09-21  Mg     2.0     09-21        Urinalysis Basic - ( 19 Sep 2019 21:53 )    Color: Yellow / Appearance: Clear / SG: >=1.030 / pH: x  Gluc: x / Ketone: NEGATIVE  / Bili: Small / Urobili: 1.0 E.U./dL   Blood: x / Protein: Trace mg/dL / Nitrite: NEGATIVE   Leuk Esterase: NEGATIVE / RBC: Many /HPF / WBC < 5 /HPF   Sq Epi: x / Non Sq Epi: 0-5 /HPF / Bacteria: Present /HPF        RADIOLOGY & ADDITIONAL STUDIES:
POD: 5  Procedure:  I&D of abscess and fistula repair    SUBJECTIVE: Patient seen and examined by chief resident on morning rounds. Pain controlled, charlie diet, ambulating, passing flatus, having BMs.       Vital Signs Last 24 Hrs  T(C): 36.1 (21 Sep 2019 06:11), Max: 37.1 (20 Sep 2019 20:45)  T(F): 97 (21 Sep 2019 06:11), Max: 98.7 (20 Sep 2019 20:45)  HR: 64 (21 Sep 2019 06:11) (64 - 74)  BP: 173/81 (21 Sep 2019 06:11) (153/75 - 173/81)  BP(mean): --  RR: 17 (21 Sep 2019 06:11) (16 - 18)  SpO2: 94% (21 Sep 2019 06:11) (94% - 96%)    Physical Exam:  General: NAD  Pulmonary: Nonlabored breathing, no respiratory distress  Abdominal: soft, nondistended, nontender with no rebound or guarding. Abscess site healing well, wound repacked.   : continued scrotal swelling though improved from before  Extremities: WWP, normal strength, no clubbing/cyanosis/edema  Neuro: A/O x3    Lines/drains/tubes:    I&O's Summary    20 Sep 2019 07:01  -  21 Sep 2019 07:00  --------------------------------------------------------  IN: 1250 mL / OUT: 2050 mL / NET: -800 mL        LABS:    09-20    140  |  107  |  26<H>  ----------------------------<  110<H>  4.3   |  24  |  1.09    Ca    8.3<L>      20 Sep 2019 06:13  Phos  2.9     09-20  Mg     2.0     09-20        Urinalysis Basic - ( 19 Sep 2019 21:53 )    Color: Yellow / Appearance: Clear / SG: >=1.030 / pH: x  Gluc: x / Ketone: NEGATIVE  / Bili: Small / Urobili: 1.0 E.U./dL   Blood: x / Protein: Trace mg/dL / Nitrite: NEGATIVE   Leuk Esterase: NEGATIVE / RBC: Many /HPF / WBC < 5 /HPF   Sq Epi: x / Non Sq Epi: 0-5 /HPF / Bacteria: Present /HPF      CAPILLARY BLOOD GLUCOSE      POCT Blood Glucose.: 97 mg/dL (20 Sep 2019 16:40)  POCT Blood Glucose.: 159 mg/dL (20 Sep 2019 11:45)  POCT Blood Glucose.: 105 mg/dL (20 Sep 2019 07:38)        RADIOLOGY & ADDITIONAL STUDIES:
POST-OP DAY: 3 s/p I&D     SUBJECTIVE: c/o scrotal swelling, dysuria/incontinence, multiple loose BMs and arm pain. Pain at anal incision controlled. Patient seen and examined bedside by chief resident.    amLODIPine   Tablet 5 milliGRAM(s) Oral daily  cefTRIAXone   IVPB 1000 milliGRAM(s) IV Intermittent every 24 hours  enoxaparin Injectable 40 milliGRAM(s) SubCutaneous every 24 hours  metroNIDAZOLE  IVPB 500 milliGRAM(s) IV Intermittent every 8 hours  tamsulosin 0.4 milliGRAM(s) Oral at bedtime    MEDICATIONS  (PRN):  HYDROmorphone  Injectable 0.5 milliGRAM(s) IV Push every 4 hours PRN Breakthrough pain  oxyCODONE    5 mG/acetaminophen 325 mG 1 Tablet(s) Oral every 4 hours PRN Moderate Pain (4 - 6)  oxyCODONE    5 mG/acetaminophen 325 mG 2 Tablet(s) Oral every 4 hours PRN Severe Pain (7 - 10)      I&O's Detail    18 Sep 2019 07:01  -  19 Sep 2019 07:00  --------------------------------------------------------  IN:    IV PiggyBack: 812.5 mL    Oral Fluid: 950 mL  Total IN: 1762.5 mL    OUT:    Stool: 11 mL    Voided: 1700 mL  Total OUT: 1711 mL    Total NET: 51.5 mL          Vital Signs Last 24 Hrs  T(C): 36.3 (19 Sep 2019 04:38), Max: 36.7 (18 Sep 2019 10:24)  T(F): 97.3 (19 Sep 2019 04:38), Max: 98.1 (18 Sep 2019 10:24)  HR: 88 (19 Sep 2019 03:03) (76 - 96)  BP: 152/67 (19 Sep 2019 03:03) (135/63 - 185/79)  BP(mean): 96 (19 Sep 2019 03:03) (91 - 121)  RR: 20 (19 Sep 2019 03:03) (18 - 29)  SpO2: 94% (19 Sep 2019 03:03) (94% - 99%)    General: NAD, resting comfortably in bed  C/V: NSR  Pulm: Nonlabored breathing, no respiratory distress  Abd: soft, NT/ND  : scrotum swollen but nontender  Back: Sacral wound undressed and repacked, some stool in wound cleaned at bedside, possible necrotic tissue noted    LABS:                        12.7   10.11 )-----------( 369      ( 19 Sep 2019 05:49 )             41.0         137  |  103  |  23  ----------------------------<  143<H>  4.2   |  25  |  1.10    Ca    8.2<L>      19 Sep 2019 05:49  Phos  2.9       Mg     2.0             Urinalysis Basic - ( 18 Sep 2019 10:30 )    Color: Yellow / Appearance: Clear / S.020 / pH: x  Gluc: x / Ketone: NEGATIVE  / Bili: Negative / Urobili: 1.0 E.U./dL   Blood: x / Protein: NEGATIVE mg/dL / Nitrite: NEGATIVE   Leuk Esterase: NEGATIVE / RBC: > 10 /HPF / WBC < 5 /HPF   Sq Epi: x / Non Sq Epi: 0-5 /HPF / Bacteria: Present /HPF
POST-OP DAY: 4 s/p I&D of abscess and fistula repair     SUBJECTIVE: Feeling much better after herrmann placement, ambulating, pain controlled, +flatus/BM. Patient seen and examined bedside by chief resident.    amLODIPine   Tablet 5 milliGRAM(s) Oral daily  cefTRIAXone   IVPB 1000 milliGRAM(s) IV Intermittent every 24 hours  enoxaparin Injectable 40 milliGRAM(s) SubCutaneous every 24 hours  metroNIDAZOLE  IVPB 500 milliGRAM(s) IV Intermittent every 8 hours  tamsulosin 0.4 milliGRAM(s) Oral at bedtime    MEDICATIONS  (PRN):  HYDROmorphone  Injectable 0.5 milliGRAM(s) IV Push every 4 hours PRN Breakthrough pain  melatonin 5 milliGRAM(s) Oral at bedtime PRN Insomnia  oxyCODONE    5 mG/acetaminophen 325 mG 1 Tablet(s) Oral every 4 hours PRN Moderate Pain (4 - 6)  oxyCODONE    5 mG/acetaminophen 325 mG 2 Tablet(s) Oral every 4 hours PRN Severe Pain (7 - 10)      I&O's Detail    19 Sep 2019 07:01  -  20 Sep 2019 07:00  --------------------------------------------------------  IN:    IV PiggyBack: 200 mL    Oral Fluid: 40 mL  Total IN: 240 mL    OUT:    Indwelling Catheter - Urethral: 1475 mL    Stool: 3 mL  Total OUT: 1478 mL    Total NET: -1238 mL          Vital Signs Last 24 Hrs  T(C): 36.8 (20 Sep 2019 05:58), Max: 37 (19 Sep 2019 10:00)  T(F): 98.3 (20 Sep 2019 05:58), Max: 98.6 (19 Sep 2019 10:00)  HR: 67 (20 Sep 2019 05:58) (62 - 78)  BP: 165/79 (20 Sep 2019 05:58) (139/63 - 169/77)  BP(mean): 107 (19 Sep 2019 15:40) (91 - 107)  RR: 19 (20 Sep 2019 05:58) (17 - 163)  SpO2: 95% (20 Sep 2019 05:58) (93% - 97%)    General: NAD, resting comfortably in bed  C/V: NSR  Pulm: Nonlabored breathing, no respiratory distress  : scrotum enlarged but nontender, some induration posteriorly, perineal edema, R tesicle larger than L  Back: Anal wound healing well, repacked      LABS:                        12.7   10.11 )-----------( 369      ( 19 Sep 2019 05:49 )             41.0     09-20    140  |  107  |  26<H>  ----------------------------<  110<H>  4.3   |  24  |  1.09    Ca    8.3<L>      20 Sep 2019 06:13  Phos  2.9     09-20  Mg     2.0     09-20        Urinalysis Basic - ( 19 Sep 2019 21:53 )    Color: Yellow / Appearance: Clear / SG: >=1.030 / pH: x  Gluc: x / Ketone: NEGATIVE  / Bili: Small / Urobili: 1.0 E.U./dL   Blood: x / Protein: Trace mg/dL / Nitrite: NEGATIVE   Leuk Esterase: NEGATIVE / RBC: Many /HPF / WBC < 5 /HPF   Sq Epi: x / Non Sq Epi: 0-5 /HPF / Bacteria: Present /HPF
SUBJECTIVE:  No acute complaints. Denies N/V, +F/BM. Pain controlled.    Vital Signs Last 24 Hrs  T(C): 36.7 (18 Sep 2019 10:24), Max: 37.2 (18 Sep 2019 05:07)  T(F): 98.1 (18 Sep 2019 10:24), Max: 98.9 (18 Sep 2019 05:07)  HR: 90 (18 Sep 2019 14:00) (60 - 96)  BP: 151/63 (18 Sep 2019 14:00) (96/52 - 160/68)  BP(mean): 91 (18 Sep 2019 14:00) (66 - 112)  RR: 26 (18 Sep 2019 14:00) (16 - 29)  SpO2: 99% (18 Sep 2019 14:00) (94% - 99%)    Physical Exam:  General: NAD  Pulmonary: Intubated, on vent  Cardiovascular: NSR  Abdominal: soft, NT/ND  GI: I&D incision on L buttock remains open, packing changed; foul smelling, no purulence    I&O's Summary  17 Sep 2019 07:01  -  18 Sep 2019 07:00  --------------------------------------------------------  IN: 2540 mL / OUT: 1320 mL / NET: 1220 mL    18 Sep 2019 07:01  -  18 Sep 2019 14:27  --------------------------------------------------------  IN: 450 mL / OUT: 753 mL / NET: -303 mL    LABS:                    11.9   12.87 )-----------( 375      ( 18 Sep 2019 05:48 )             37.1     -18  141  |  108  |  34<H>  ----------------------------<  112<H>  4.9   |  22  |  1.32<H>    Ca    8.2<L>      18 Sep 2019 05:48  Phos  2.1       Mg     2.2         Urinalysis Basic - ( 18 Sep 2019 10:30 )  Color: Yellow / Appearance: Clear / S.020 / pH: x  Gluc: x / Ketone: NEGATIVE  / Bili: Negative / Urobili: 1.0 E.U./dL   Blood: x / Protein: NEGATIVE mg/dL / Nitrite: NEGATIVE   Leuk Esterase: NEGATIVE / RBC: > 10 /HPF / WBC < 5 /HPF   Sq Epi: x / Non Sq Epi: 0-5 /HPF / Bacteria: Present /HPF    CAPILLARY BLOOD GLUCOSE  POCT Blood Glucose.: 131 mg/dL (18 Sep 2019 11:47)  POCT Blood Glucose.: 118 mg/dL (18 Sep 2019 06:41)  POCT Blood Glucose.: 115 mg/dL (17 Sep 2019 23:01)  POCT Blood Glucose.: 150 mg/dL (17 Sep 2019 21:24)  POCT Blood Glucose.: 130 mg/dL (17 Sep 2019 16:15)
SUBJECTIVE: Patient c/o urinary frequency, improving dysuria. Denies chest pain, SOB, N/V.    MEDICATIONS  (STANDING):  chlorhexidine 2% Cloths 1 Application(s) Topical <User Schedule>  docusate sodium 100 milliGRAM(s) Oral two times a day  enoxaparin Injectable 40 milliGRAM(s) SubCutaneous every 24 hours  insulin lispro (HumaLOG) corrective regimen sliding scale   SubCutaneous every 6 hours  piperacillin/tazobactam IVPB.. 3.375 Gram(s) IV Intermittent every 6 hours  vancomycin  IVPB 1250 milliGRAM(s) IV Intermittent every 12 hours    MEDICATIONS  (PRN):  bisacodyl 5 milliGRAM(s) Oral every 12 hours PRN Constipation  HYDROmorphone  Injectable 0.5 milliGRAM(s) IV Push every 4 hours PRN Breakthrough pain  oxyCODONE    5 mG/acetaminophen 325 mG 1 Tablet(s) Oral every 4 hours PRN Moderate Pain (4 - 6)  oxyCODONE    5 mG/acetaminophen 325 mG 2 Tablet(s) Oral every 4 hours PRN Severe Pain (7 - 10)      ICU Vital Signs Last 24 Hrs  T(C): 37.2 (18 Sep 2019 05:07), Max: 37.2 (18 Sep 2019 05:07)  T(F): 98.9 (18 Sep 2019 05:07), Max: 98.9 (18 Sep 2019 05:07)  HR: 82 (18 Sep 2019 08:00) (60 - 96)  BP: 140/72 (18 Sep 2019 08:00) (89/44 - 144/84)  BP(mean): 93 (18 Sep 2019 08:00) (58 - 105)  ABP: 114/60 (17 Sep 2019 20:00) (92/46 - 138/70)  ABP(mean): 82 (17 Sep 2019 20:00) (62 - 94)  RR: 26 (18 Sep 2019 08:00) (16 - 28)  SpO2: 95% (18 Sep 2019 08:00) (94% - 99%)      Physical Exam:  General: NAD  HEENT: NC/AT  Pulmonary: CTAB  Abdominal: Soft, nontender, distended  Rectal: Anal skin tags, incision with clean wound without purulent drainage  : No Dumont  Neuro: CN II-XII intact. AAOx3      I&O's Summary    17 Sep 2019 07:01  -  18 Sep 2019 07:00  --------------------------------------------------------  IN: 2540 mL / OUT: 1320 mL / NET: 1220 mL      LABS:                         11.9   12.87 )-------( 375      ( 18 Sep 2019 05:48 )             37.1     09-18    141  |  108  |  34<H>  ----------------------------<  112<H>  4.9   |  22  |  1.32<H>    Ca    8.2<L>      18 Sep 2019 05:48  Phos  2.1     09-18  Mg     2.2     09-18    TPro  5.0<L>  /  Alb  2.4<L>  /  TBili  0.9  /  DBili  x   /  AST  37  /  ALT  51<H>  /  AlkPhos  75  09-16       PT/INR - ( 16 Sep 2019 09:57 )   PT: 16.7 sec;   INR: 1.46     PTT - ( 16 Sep 2019 09:57 )  PTT:30.0 sec      CAPILLARY BLOOD GLUCOSE    POCT Blood Glucose.: 118 mg/dL (18 Sep 2019 06:41)  POCT Blood Glucose.: 115 mg/dL (17 Sep 2019 23:01)  POCT Blood Glucose.: 150 mg/dL (17 Sep 2019 21:24)  POCT Blood Glucose.: 130 mg/dL (17 Sep 2019 16:15)  POCT Blood Glucose.: 163 mg/dL (17 Sep 2019 10:41)      LIVER FUNCTIONS - ( 16 Sep 2019 09:57 )  Alb: 2.4 g/dL / Pro: 5.0 g/dL / ALK PHOS: 75 U/L / ALT: 51 U/L / AST: 37 U/L / GGT: x                    CULTURES:    Culture - Abscess with Gram Stain (collected 16 Sep 2019 10:36)  Source: .Abscess Ischeorectal Abscess  Gram Stain (17 Sep 2019 12:19):    Few Gram Negative Rods    Few Gram Positive Cocci in Pairs and Chains    Moderate White blood cells  Preliminary Report (17 Sep 2019 14:25):    Moderate Escherichia coli    Susceptibility to follow.    Moderate Streptococcus intermedius    Susceptibility to follow.  Organism: Streptococcus intermedius (17 Sep 2019 14:26)      Culture - Blood (collected 16 Sep 2019 01:22)  Source: .Blood Blood-Venous  Preliminary Report (17 Sep 2019 02:01):    No growth to date.
SUBJECTIVE: Pt seen and examined at bedside with chief. Pt denies any complaints. Pain well controlled. Tolerating diet without N/V. Admits to [facility] [place]    MEDICATIONS  (STANDING):  cefTRIAXone   IVPB 1000 milliGRAM(s) IV Intermittent every 24 hours  enoxaparin Injectable 40 milliGRAM(s) SubCutaneous every 24 hours  losartan 50 milliGRAM(s) Oral daily  metroNIDAZOLE  IVPB 500 milliGRAM(s) IV Intermittent every 8 hours  tamsulosin 0.4 milliGRAM(s) Oral at bedtime    MEDICATIONS  (PRN):  melatonin 5 milliGRAM(s) Oral at bedtime PRN Insomnia  oxyCODONE    5 mG/acetaminophen 325 mG 1 Tablet(s) Oral every 4 hours PRN Moderate Pain (4 - 6)  oxyCODONE    5 mG/acetaminophen 325 mG 2 Tablet(s) Oral every 4 hours PRN Severe Pain (7 - 10)      Vital Signs Last 24 Hrs  T(C): 36.6 (22 Sep 2019 08:10), Max: 37 (22 Sep 2019 05:57)  T(F): 97.9 (22 Sep 2019 08:10), Max: 98.6 (22 Sep 2019 05:57)  HR: 63 (22 Sep 2019 08:10) (63 - 77)  BP: 164/80 (22 Sep 2019 08:10) (164/80 - 178/74)  BP(mean): --  RR: 17 (22 Sep 2019 08:10) (17 - 18)  SpO2: 96% (22 Sep 2019 08:10) (95% - 98%)    PHYSICAL EXAM:      Constitutional: A&Ox3    Respiratory: non labored breathing, no respiratory distress    Cardiovascular: NSR, RRR    Gastrointestinal: soft ND,NT    Perineum: wound w/ gauze packing.    Genitourinary: Voiding     Extremities: (-) edema                  I&O's Detail    21 Sep 2019 07:01  -  22 Sep 2019 07:00  --------------------------------------------------------  IN:    IV PiggyBack: 100 mL    Oral Fluid: 1030 mL  Total IN: 1130 mL    OUT:    Indwelling Catheter - Urethral: 400 mL    Stool: 2 mL    Voided: 825 mL  Total OUT: 1227 mL    Total NET: -97 mL          LABS:    09-22    139  |  105  |  18  ----------------------------<  100<H>  4.0   |  26  |  0.85    Ca    8.4      22 Sep 2019 08:20  Phos  3.0     09-22  Mg     1.8     09-22            RADIOLOGY & ADDITIONAL STUDIES:

## 2019-09-22 NOTE — PROGRESS NOTE ADULT - ASSESSMENT
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    - Extubate today per SICU  - Continue broad spectrum abx  - f/u OR cultures  - Daily packing changes  - Remaining care per SICU
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     NEURO: Percocet PRN pain  CV: S/p septic shock, off levophed, No IVF HTN: Home losartan held 2* inc Cr on Norvasc  PULM: Satting well on NC  GI/FEN: Low Res Diet  : BPH. Flomax. Dumont can come out today, TOV  ENDO: ISS.   ID: perirectal abscess Ecoli and strep intermedius: Ceftriaxone( 9/19-) Flagyl( 9/19-)   PPX: SCD, SQL  WOUNDS/DRAINS: perirectal wound packed with betadine soaked kerlix. sitz baths TID
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    - Continue daily dressing/packing changes  - Continue broad spectrum abx  - f/u OR cultures  - SDU
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     NEURO: Percocet PRN pain  CV: S/p septic shock, off levophed, No IVF HTN: Home losartan held 2* inc Cr on Norvasc  PULM: Satting well on NC  GI/FEN: Low Res Diet  : BPH. Flomax  ENDO: ISS   ID: perirectal abscess Ecoli and strep intermedius: Ceftriaxone( 9/19-) Flagyl( 9/19-)   PPX: SCD, SQL  WOUNDS/DRAINS: perirectal wound packed with gauze. sitz baths TID
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     NEURO: Percocet PRN pain  CV: S/p septic shock, off levophed, No IVF HTN: Home losartan held 2* inc Cr on Norvasc  PULM: Satting well on NC  GI/FEN: Low Res Diet  : BPH. Flomax. Dumont.  ENDO: ISS.   ID: perirectal abscess Ecoli and strep intermedius: Ceftriaxone( 9/19-) Flagyl( 9/19-)   PPX: SCD, SQL  WOUNDS/DRAINS: perirectal wound packed with betadine soaked kerlix. sitz baths TID
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     NEURO: Percocet PRN pain  CV: S/p septic shock, off levophed, No IVF HTN: Home losartan held 2* inc Cr on Norvasc  PULM: Satting well on NC  GI/FEN: Low Res Diet,   : BPH. Flomax  ENDO: ISS.   ID: perirectal abscess Ecoli and strep intermedius: Ceftriaxone( 9/19-) Flagyl( 9/19-) D/c: : Vanco( 9/16-9/19) zosyn)( 9/16-9/19) clinda (9/16-9/17).   PPX: SCD, SQL  LINES: [facility]. [place] (9/16-9/17)   WOUNDS/DRAINS: perirectal wound packed with betadine soaked kerlix.
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 Patient is clinically progressing well after extubation and is no longer requiring pressor support.    NEURO: Dilaudid prn   CV: S/p septic shock, off levophed  PULM: Satting well on NC  GI/FEN: NPO, NS@110. protonix   : BPH, 18F coude herrmann placed in OR by urology.  ENDO: ISS.   ID: perirectal abscess Ecolio and strep intermedius: Vanco( 9/16-) zosyn)( 9/16-) clinda (9/16--).   PPX: SCD, SQL  LINES: [facility] (9/16--)   WOUNDS/DRAINS: perirectal wound packed with betadine soaked kerlix.
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 Patient is clinically progressing well after extubation and is no longer requiring pressor support.    NEURO: Dilaudid prn   CV: S/p septic shock, off levophed  PULM: Satting well on NC  GI/FEN: Regular diet. protonix   : BPH. Dumont dc'ed.  ENDO: ISS.   ID: perirectal abscess E coli and strep intermedius: Vanco( 9/16-) zosyn)( 9/16-) clinda (9/16-9/17).   PPX: SCD, SQL  LINES: PIV. [place] (9/16-9/17)   WOUNDS/DRAINS: perirectal wound packed with betadine soaked kerlix.

## 2019-09-22 NOTE — DISCHARGE NOTE PROVIDER - HOSPITAL COURSE
75 y/o male w/ PMH of pre-diabetes, HTN, HLD, BPH, who presented to [facility] ED w/ constant non-radiating anal pain over the five days with subjective fever and chills. He also states that he has had perianal itching over the past 5 weeks which was treated w/ preparation H by his outpatient doctor. He was seen at [facility] a day prior to arrival to ED and treated with Bactrim and Ibuprofen, which was insufficient in alleviating his pain. He had no change in bowel movements, no diarrhea, or blood in his stool. CT performed in the ED showed a large right perianal abscess with fistulous connection to the anus posteriorly as well as extension into the right perineum, buttock and right leg. The patient has been admitted to [facility] for further management. Pt was taken to the OR for ID of perianal abscess. Procedure was uncomplicated. Post op course was uneventful. Pt remained for IV abx, Pt tolerated PO intake, voided adequately and remained hemodynamically stable. At time of discharge pt was stable.

## 2019-09-22 NOTE — DISCHARGE NOTE NURSING/CASE MANAGEMENT/SOCIAL WORK - PATIENT PORTAL LINK FT
You can access the FollowMyHealth Patient Portal offered by [facility] by registering at the following website: [email or web address]. By joining [employer]’s FollowMyHealth portal, you will also be able to view your health information using other applications (apps) compatible with our system.

## 2019-09-22 NOTE — DISCHARGE NOTE PROVIDER - CARE PROVIDER_API CALL
Ellis Alegre (MD)  Surgery  [address], [facility], [place] [address]  Phone: [phone]  Fax: [phone]  Follow Up Time:

## 2019-09-22 NOTE — DISCHARGE NOTE PROVIDER - NSDCCPCAREPLAN_GEN_ALL_CORE_FT
PRINCIPAL DISCHARGE DIAGNOSIS  Diagnosis: Perirectal abscess  Assessment and Plan of Treatment: Follow up with Dr. Codey newell 1-2 weeks, call the number provided below to make an appointment. You may continue your daily routine. You may continue regular diet.  You should be doing Sitz bath 3 times a day or after every bowel movement.  Change packing twice daily or after each bowel movement.  insert gauze into wound and cover with another gauze and paper tape.

## 2019-09-25 DIAGNOSIS — Z79.84 LONG TERM (CURRENT) USE OF ORAL HYPOGLYCEMIC DRUGS: ICD-10-CM

## 2019-09-25 DIAGNOSIS — E78.5 HYPERLIPIDEMIA, UNSPECIFIED: ICD-10-CM

## 2019-09-25 DIAGNOSIS — K61.1 RECTAL ABSCESS: ICD-10-CM

## 2019-09-25 DIAGNOSIS — R73.03 PREDIABETES: ICD-10-CM

## 2019-09-25 DIAGNOSIS — I10 ESSENTIAL (PRIMARY) HYPERTENSION: ICD-10-CM

## 2019-09-25 DIAGNOSIS — N50.89 OTHER SPECIFIED DISORDERS OF THE MALE GENITAL ORGANS: ICD-10-CM

## 2019-09-25 DIAGNOSIS — B96.20 UNSPECIFIED ESCHERICHIA COLI [E. COLI] AS THE CAUSE OF DISEASES CLASSIFIED ELSEWHERE: ICD-10-CM

## 2019-09-25 DIAGNOSIS — Z72.52 HIGH RISK HOMOSEXUAL BEHAVIOR: ICD-10-CM

## 2019-09-25 DIAGNOSIS — N49.3 FOURNIER GANGRENE: ICD-10-CM

## 2019-09-25 DIAGNOSIS — N40.0 BENIGN PROSTATIC HYPERPLASIA WITHOUT LOWER URINARY TRACT SYMPTOMS: ICD-10-CM

## 2019-09-25 DIAGNOSIS — R65.21 SEVERE SEPSIS WITH SEPTIC SHOCK: ICD-10-CM

## 2019-09-25 DIAGNOSIS — B95.4 OTHER STREPTOCOCCUS AS THE CAUSE OF DISEASES CLASSIFIED ELSEWHERE: ICD-10-CM

## 2019-09-25 DIAGNOSIS — A41.9 SEPSIS, UNSPECIFIED ORGANISM: ICD-10-CM

## 2019-10-01 PROBLEM — I10 ESSENTIAL (PRIMARY) HYPERTENSION: Chronic | Status: ACTIVE | Noted: 2019-09-16

## 2019-10-01 PROBLEM — E78.5 HYPERLIPIDEMIA, UNSPECIFIED: Chronic | Status: ACTIVE | Noted: 2019-09-16

## 2019-10-07 ENCOUNTER — APPOINTMENT (OUTPATIENT)
Dept: SURGERY | Facility: CLINIC | Age: 74
End: 2019-10-07
Payer: MEDICARE

## 2019-10-07 VITALS
OXYGEN SATURATION: 97 % | HEART RATE: 79 BPM | DIASTOLIC BLOOD PRESSURE: 89 MMHG | SYSTOLIC BLOOD PRESSURE: 154 MMHG | WEIGHT: 164.5 LBS | TEMPERATURE: 97.8 F | BODY MASS INDEX: 28.09 KG/M2 | HEIGHT: 64 IN

## 2019-10-07 DIAGNOSIS — Z82.5 FAMILY HISTORY OF ASTHMA AND OTHER CHRONIC LOWER RESPIRATORY DISEASES: ICD-10-CM

## 2019-10-07 DIAGNOSIS — Z83.3 FAMILY HISTORY OF DIABETES MELLITUS: ICD-10-CM

## 2019-10-07 DIAGNOSIS — Z78.9 OTHER SPECIFIED HEALTH STATUS: ICD-10-CM

## 2019-10-07 DIAGNOSIS — Z80.42 FAMILY HISTORY OF MALIGNANT NEOPLASM OF PROSTATE: ICD-10-CM

## 2019-10-07 PROCEDURE — 99024 POSTOP FOLLOW-UP VISIT: CPT

## 2019-10-07 RX ORDER — FENOFIBRATE 40 MG/1
40 TABLET ORAL
Refills: 0 | Status: ACTIVE | COMMUNITY

## 2019-10-07 RX ORDER — METFORMIN HYDROCHLORIDE 500 MG/1
500 TABLET, COATED ORAL
Refills: 0 | Status: ACTIVE | COMMUNITY

## 2019-10-07 RX ORDER — OMEGA-3-ACID ETHYL ESTERS 1 G/1
1 CAPSULE, LIQUID FILLED ORAL
Refills: 0 | Status: ACTIVE | COMMUNITY

## 2019-10-07 RX ORDER — LOSARTAN POTASSIUM 50 MG/1
50 TABLET, FILM COATED ORAL
Refills: 0 | Status: ACTIVE | COMMUNITY

## 2019-10-08 NOTE — REASON FOR VISIT
[Follow-Up: _____] : a [unfilled] follow-up visit [FreeTextEntry1] : sepsis secondary to ischiorectal abscess

## 2019-10-08 NOTE — HISTORY OF PRESENT ILLNESS
[de-identified] : This is a 74 year old male who came to [facility] in acute distress with sepsis and shock secondary to ischiorectal abscess. He underwent urgent incision and drainage of abscess and had subsequent stay in ICU with intubation and resolution of his sepsis. He has been feeling better. Has been managing the would on his own. REports no fevers, chills or other issues.

## 2019-10-08 NOTE — PHYSICAL EXAM
[Normal Breath Sounds] : Normal breath sounds [JVD] : no jugular venous distention  [Normal Heart Sounds] : normal heart sounds [Abdominal Masses] : No abdominal masses [Abdomen Tenderness] : ~T ~M No abdominal tenderness [Enlarged] : not enlarged [Tender] : was nontender [de-identified] : BAHMAN. Meaghan. Appropriate. Comfortable. [Calm] : calm [de-identified] : Supple. Trachea midline. No overt lymphadenopathy. No JVD [de-identified] : Pupils equal. No Scleral Icterus. NCAT. [de-identified] : Abdomen is soft, non tender and non distended. Do not appreciate any overt mass. No overt hernia detected\par  [de-identified] : The wound from the incision and drainage is healing well. There is no longer overt induration. There is visible granulation tissue. No longer severely tender. There is some small amount of drainage from the area.

## 2019-10-13 PROCEDURE — 36415 COLL VENOUS BLD VENIPUNCTURE: CPT

## 2019-10-13 PROCEDURE — 87491 CHLMYD TRACH DNA AMP PROBE: CPT

## 2019-10-13 PROCEDURE — 81003 URINALYSIS AUTO W/O SCOPE: CPT

## 2019-10-13 PROCEDURE — 87110 CHLAMYDIA CULTURE: CPT

## 2019-10-13 PROCEDURE — 97116 GAIT TRAINING THERAPY: CPT

## 2019-10-13 PROCEDURE — 87186 SC STD MICRODIL/AGAR DIL: CPT

## 2019-10-13 PROCEDURE — 99285 EMERGENCY DEPT VISIT HI MDM: CPT | Mod: 25

## 2019-10-13 PROCEDURE — 87070 CULTURE OTHR SPECIMN AEROBIC: CPT

## 2019-10-13 PROCEDURE — 85025 COMPLETE CBC W/AUTO DIFF WBC: CPT

## 2019-10-13 PROCEDURE — 87389 HIV-1 AG W/HIV-1&-2 AB AG IA: CPT

## 2019-10-13 PROCEDURE — 84100 ASSAY OF PHOSPHORUS: CPT

## 2019-10-13 PROCEDURE — 87205 SMEAR GRAM STAIN: CPT

## 2019-10-13 PROCEDURE — 97110 THERAPEUTIC EXERCISES: CPT

## 2019-10-13 PROCEDURE — 83735 ASSAY OF MAGNESIUM: CPT

## 2019-10-13 PROCEDURE — 96376 TX/PRO/DX INJ SAME DRUG ADON: CPT | Mod: XU

## 2019-10-13 PROCEDURE — 94002 VENT MGMT INPAT INIT DAY: CPT

## 2019-10-13 PROCEDURE — 81001 URINALYSIS AUTO W/SCOPE: CPT

## 2019-10-13 PROCEDURE — 85027 COMPLETE CBC AUTOMATED: CPT

## 2019-10-13 PROCEDURE — 83605 ASSAY OF LACTIC ACID: CPT

## 2019-10-13 PROCEDURE — 83036 HEMOGLOBIN GLYCOSYLATED A1C: CPT

## 2019-10-13 PROCEDURE — 82803 BLOOD GASES ANY COMBINATION: CPT

## 2019-10-13 PROCEDURE — 97161 PT EVAL LOW COMPLEX 20 MIN: CPT

## 2019-10-13 PROCEDURE — C9399: CPT

## 2019-10-13 PROCEDURE — 80048 BASIC METABOLIC PNL TOTAL CA: CPT

## 2019-10-13 PROCEDURE — 80053 COMPREHEN METABOLIC PANEL: CPT

## 2019-10-13 PROCEDURE — 86900 BLOOD TYPING SEROLOGIC ABO: CPT

## 2019-10-13 PROCEDURE — 71045 X-RAY EXAM CHEST 1 VIEW: CPT

## 2019-10-13 PROCEDURE — 87184 SC STD DISK METHOD PER PLATE: CPT

## 2019-10-13 PROCEDURE — 86901 BLOOD TYPING SEROLOGIC RH(D): CPT

## 2019-10-13 PROCEDURE — 87040 BLOOD CULTURE FOR BACTERIA: CPT

## 2019-10-13 PROCEDURE — 96366 THER/PROPH/DIAG IV INF ADDON: CPT | Mod: XU

## 2019-10-13 PROCEDURE — 96361 HYDRATE IV INFUSION ADD-ON: CPT

## 2019-10-13 PROCEDURE — 87591 N.GONORRHOEAE DNA AMP PROB: CPT

## 2019-10-13 PROCEDURE — 86850 RBC ANTIBODY SCREEN: CPT

## 2019-10-13 PROCEDURE — 85610 PROTHROMBIN TIME: CPT

## 2019-10-13 PROCEDURE — 96375 TX/PRO/DX INJ NEW DRUG ADDON: CPT | Mod: XU

## 2019-10-13 PROCEDURE — 86803 HEPATITIS C AB TEST: CPT

## 2019-10-13 PROCEDURE — 72193 CT PELVIS W/DYE: CPT

## 2019-10-13 PROCEDURE — 86780 TREPONEMA PALLIDUM: CPT

## 2019-10-13 PROCEDURE — 96365 THER/PROPH/DIAG IV INF INIT: CPT | Mod: XU

## 2019-10-13 PROCEDURE — 80202 ASSAY OF VANCOMYCIN: CPT

## 2019-10-13 PROCEDURE — 82962 GLUCOSE BLOOD TEST: CPT

## 2019-10-13 PROCEDURE — 85730 THROMBOPLASTIN TIME PARTIAL: CPT

## 2019-10-13 PROCEDURE — 87075 CULTR BACTERIA EXCEPT BLOOD: CPT

## 2019-11-04 ENCOUNTER — APPOINTMENT (OUTPATIENT)
Dept: SURGERY | Facility: CLINIC | Age: 74
End: 2019-11-04
Payer: MEDICARE

## 2019-11-04 VITALS
OXYGEN SATURATION: 97 % | SYSTOLIC BLOOD PRESSURE: 170 MMHG | HEART RATE: 66 BPM | HEIGHT: 64 IN | BODY MASS INDEX: 29.8 KG/M2 | DIASTOLIC BLOOD PRESSURE: 90 MMHG | TEMPERATURE: 97.3 F | WEIGHT: 174.56 LBS

## 2019-11-04 PROCEDURE — 99213 OFFICE O/P EST LOW 20 MIN: CPT | Mod: 24

## 2019-11-06 ENCOUNTER — LABORATORY RESULT (OUTPATIENT)
Age: 74
End: 2019-11-06

## 2019-11-06 ENCOUNTER — APPOINTMENT (OUTPATIENT)
Dept: ORTHOPEDIC SURGERY | Facility: CLINIC | Age: 74
End: 2019-11-06
Payer: MEDICARE

## 2019-11-06 VITALS — WEIGHT: 174 LBS | HEIGHT: 64 IN | BODY MASS INDEX: 29.71 KG/M2 | RESPIRATION RATE: 14 BRPM

## 2019-11-06 PROCEDURE — 73140 X-RAY EXAM OF FINGER(S): CPT | Mod: F5

## 2019-11-06 PROCEDURE — 99203 OFFICE O/P NEW LOW 30 MIN: CPT | Mod: 25

## 2019-11-06 PROCEDURE — 11042 DBRDMT SUBQ TIS 1ST 20SQCM/<: CPT | Mod: RT

## 2019-11-07 NOTE — ASSESSMENT
[FreeTextEntry1] : 74 Year old male. Recovered from sepsis secondary to ischiorectal abscess requiring ICU stay, now s/p incision and drainage. Possible fistula. I have referred him to Dr. Curtis for further work up and management. I have answered all questions. This area is healing well.\par \par Unrelated the patietn has a new hand infection. I do not appreciate an area to drain yet near the nailbed. Regardless will start oral abx. I have referred him to hand surgery for follow up and treatment as abscess may develop.

## 2019-11-07 NOTE — PHYSICAL EXAM
[Normal Breath Sounds] : Normal breath sounds [Normal Heart Sounds] : normal heart sounds [Calm] : calm [JVD] : no jugular venous distention  [Abdominal Masses] : No abdominal masses [Abdomen Tenderness] : ~T ~M No abdominal tenderness [Tender] : was nontender [Enlarged] : not enlarged [de-identified] : BAHMAN. Meaghan. Appropriate. Comfortable. [de-identified] : Pupils equal. No Scleral Icterus. NCAT. [de-identified] : Supple. Trachea midline. No overt lymphadenopathy. No JVD [de-identified] : Abdomen is soft, non tender and non distended. Do not appreciate any overt mass. No overt hernia detected\par  [de-identified] : The wound from the incision and drainage is healing well. There is no longer overt induration. There is visible granulation tissue. No longer severely tender. There is no drainage. The area has contracted signficantly.  [de-identified] : Hand infection, induration. Reddended. Do not appreciate fluctuance yet.

## 2019-11-07 NOTE — HISTORY OF PRESENT ILLNESS
[de-identified] : This is a 74 year old male who came to [facility] in acute distress with sepsis and shock secondary to ischiorectal abscess. He underwent urgent incision and drainage of abscess and had subsequent stay in ICU with intubation and resolution of his sepsis. He has been feeling better. Has been managing the would on his own. REports no fevers, chills or other issues.  [de-identified] : 11-4-2019 - Returns today for follow up. Doing well overall. Does not have any complaints related to the ischiorectal abscess. Has been taking good care of it. Reports it healing well. Has not seen CRS Dr. Curtis as of yet to evalute for fistula. Unrelated reports new infection of his finger/hand secondary to cutting himself with a can.

## 2019-11-11 ENCOUNTER — APPOINTMENT (OUTPATIENT)
Dept: COLORECTAL SURGERY | Facility: CLINIC | Age: 74
End: 2019-11-11
Payer: MEDICARE

## 2019-11-11 ENCOUNTER — TRANSCRIPTION ENCOUNTER (OUTPATIENT)
Age: 74
End: 2019-11-11

## 2019-11-11 VITALS
BODY MASS INDEX: 30.04 KG/M2 | TEMPERATURE: 97.9 F | SYSTOLIC BLOOD PRESSURE: 148 MMHG | HEART RATE: 77 BPM | DIASTOLIC BLOOD PRESSURE: 80 MMHG | WEIGHT: 175 LBS

## 2019-11-11 PROCEDURE — 46600 DIAGNOSTIC ANOSCOPY SPX: CPT

## 2019-11-11 PROCEDURE — 99202 OFFICE O/P NEW SF 15 MIN: CPT | Mod: 25

## 2019-11-13 NOTE — PHYSICAL EXAM
[FreeTextEntry1] :  Medical assistant present for duration of physical examination\par \par Gen NAD, AOx3\par \par Anorectal Exam:\par Inspection no erythema, induration or fluctuance, no skin excoriation, no fissure, right sided I&D site well healed, perianal hemorrhoidal tags\par STEVE nontender, no masses palpated, no blood on gloved finger\par Anoscopy no fissure, nonfriable internal hemorrhoids, no intraanal drainage\par \par

## 2019-11-13 NOTE — DATA REVIEWED
[FreeTextEntry1] : EXAM: CT PELVIS ONLY IC \par \par PROCEDURE DATE: 09/15/2019 \par \par \par \par INTERPRETATION: CT of the PELVIS with intravenous contrast dated 9/15/2019 \par 11:29 PM \par \par INDICATION: Buttock pain, evaluate for rectal or perirectal abscess \par \par TECHNIQUE: CT of the pelvis with intravenous contrast only. Axial, sagittal, \par and coronal images were obtained and reviewed. 100 cc Omnipaque 350 \par intravenous contrast was administered; 5 cc was discarded. \par \par COMPARISON: None. \par \par FINDINGS: \par \par There is a partially imaged large gas and fluid containing collection with \par areas of thin rim enhancement centered in the posterior right perineum with \par fistulous connection to the posterior anus, compatible with a perianal \par abscess. This abscess measures approximately 9 x 4 cm in AP and transverse \par diameters, respectively, and at least 10 cm in craniocaudal diameter. This \par abscess extends into the inferomedial right buttock subcutaneous tissues \par with its inferior extent excluded from the field-of-view. Associated diffuse \par subcutaneous fat stranding of the right buttock is noted. The abscess does \par not extend into the perirectal fat. \par \par The visualized bowel demonstrates no significant abnormality. The prostate \par is mildly enlarged with a transverse diameter of 5.2 cm. The urinary bladder \par is unremarkable. No pelvic ascites or lymphadenopathy is identified. \par Moderate aortoiliac calcifications are noted. There are small to \par moderate-sized fat-containing right direct and indirect inguinal hernias and \par a tiny direct left inguinal hernia. A moderate left inguinal canal lipoma is \par present. Degenerative changes of the spine are demonstrated. \par \par \par IMPRESSION: \par \par Large gas and fluid containing perianal abscess centered in the right \par perineum with fistulous connection to the anus posteriorly and incompletely \par visualized extension into the inferomedial right buttock. This is highly \par suspicious for Vida's gangrene. \par \par The findings above were discussed with Dr. Justin on 9/15/2019 at 11:35 PM.

## 2019-11-13 NOTE — HISTORY OF PRESENT ILLNESS
[FreeTextEntry1] : 75yo male presents for initial evaluation - reason for visit "anal fistula"\par \par Recent admission September 2019 for sepsis and shock secondary to ischiorectal abscess. He underwent urgent incision and drainage of abscess and had subsequent stay in ICU with intubation and resolution of his sepsis. Managed by acute care surgery Dr Alegre on that admission, who referred patient for outpatient follow up\par \par No prior history of ischiorectal abscess\par Colonoscopy 2016, polyps removed, due to next scope 2021\par Denies family history of CRC or IBD\par \par MSM, anoreceptive in past, reports dildo use\par \par Moves bowels 3x/day at baseline\par Denies diarrhea or constipation \par Denies anorectal pain or BRB\par Denies discharge or fever\par Reports adequate dietary fiber intake\par Doing sitz baths\par Using donut cushion \par \par Recently completed antibiotics for finger infection

## 2019-11-13 NOTE — ASSESSMENT
[FreeTextEntry1] : Exam findings and diagnosis were discussed at length with patient.\par Discussed fistula seen on CT in September in association with ischiorectal abscess\par Recommend interval reimaging with MRI pelvis to assess if fistula persists\par Discussed risk of recurrent abscess/sepsis if fistula identified \par F/u and further recommendations pending imaging\par All questions were answered, patient expressed understanding, and is agreeable to this plan.\par

## 2019-11-13 NOTE — CONSULT LETTER
[Consult Letter:] : I had the pleasure of evaluating your patient, [unfilled]. [Dear  ___] : Dear  [unfilled], [( Thank you for referring [unfilled] for consultation for _____ )] : Thank you for referring [unfilled] for consultation for [unfilled] [Please see my note below.] : Please see my note below. [Consult Closing:] : Thank you very much for allowing me to participate in the care of this patient.  If you have any questions, please do not hesitate to contact me. [Sincerely,] : Sincerely, [FreeTextEntry2] : Dr. Ellis Salinas\par [address] [place] [address]\[place] [address] [FreeTextEntry3] : Landy Curtis MD

## 2020-01-07 NOTE — DISCHARGE NOTE NURSING/CASE MANAGEMENT/SOCIAL WORK - CONTRAINDICATIONS & PRECAUTIONS (SELECT ALL THAT APPLY)
medium
Moderate to severe acute illness with or without fever. Delay administration of vaccination until patient has been afebrile or illness resolved for 24hrs

## 2020-03-03 ENCOUNTER — APPOINTMENT (OUTPATIENT)
Dept: COLORECTAL SURGERY | Facility: CLINIC | Age: 75
End: 2020-03-03
Payer: MEDICARE

## 2020-03-03 VITALS
SYSTOLIC BLOOD PRESSURE: 168 MMHG | HEART RATE: 58 BPM | HEIGHT: 64 IN | TEMPERATURE: 97.9 F | DIASTOLIC BLOOD PRESSURE: 80 MMHG

## 2020-03-03 PROCEDURE — 46050 I&D PERIANAL ABSCESS SUPFC: CPT

## 2020-03-03 PROCEDURE — 99212 OFFICE O/P EST SF 10 MIN: CPT | Mod: 25

## 2020-03-03 RX ORDER — TAMSULOSIN HYDROCHLORIDE 0.4 MG/1
0.4 CAPSULE ORAL
Qty: 90 | Refills: 0 | Status: ACTIVE | COMMUNITY
Start: 2020-01-29

## 2020-03-03 RX ORDER — CETIRIZINE HYDROCHLORIDE 10 MG/1
10 TABLET, COATED ORAL
Qty: 90 | Refills: 0 | Status: ACTIVE | COMMUNITY
Start: 2020-02-05

## 2020-03-03 RX ORDER — AMOXICILLIN AND CLAVULANATE POTASSIUM 875; 125 MG/1; MG/1
875-125 TABLET, COATED ORAL
Qty: 14 | Refills: 1 | Status: DISCONTINUED | COMMUNITY
Start: 2019-11-04 | End: 2020-03-03

## 2020-03-03 NOTE — HISTORY OF PRESENT ILLNESS
[FreeTextEntry1] : 76 yo M presents for f/u abscess\par hx of admission September 2019 for sepsis and shock secondary to ischiorectal abscess, s/p urgent incision and drainage of abscess w/ Dr. Alegre and had subsequent stay in ICU with intubation and resolution of his sepsis\par CT a/p 9/2019 w/ large gas fluid containing perianal abscess centered in the right perineum to the anus posteriorly and incompletely visualized extension into the inferomedial right buttock. This is highly \par suspicious for Vida's gangrene\par \par Seen 11/11/2019 for post hospitalization follow up, noted right sided I&D site well healed on exam. \par Recommended interval MRI pelvis at that time to assess for persistent fistula\par MRI was not scheduled as per patient\par \par Pt presents today c/o new abscess as of this morning adjacent to previous scar.\par Reports area of "hardness" and tenderness when sitting\par Patient was unable to get an appt w/ Dr. Alegre and was advised to follow up with this office\par Denies fever or drainage\par Has not tried sitz baths\par MSM, hx of anal receptive sex in the past, denies at present\par BH: 3-4 times daily, denies complaints\par \par Colonoscopy 2016, polyps removed, due to next scope 2021\par Denies family history of CRC or IBD\par Denies ASA/NSAID use

## 2020-03-03 NOTE — PHYSICAL EXAM
[FreeTextEntry1] :  Medical assistant present for duration of physical examination\par \par Gen NAD, AOx3\par \par Anorectal Exam:\par Inspection erythema and induration near right sided I&D site concerning for recurrent abscess, perianal hemorrhoidal tags\par \par \par

## 2020-03-03 NOTE — ASSESSMENT
[FreeTextEntry1] : Exam findings and diagnosis were discussed at length with patient.\par Possible early recurrent perirectal abscess on exam\par Recommend augmentin for cellulitis\par Recommend MRI pelvis to rule out underlying fistula\par Further recommendations pending imaging\par All questions were answered, patient expressed understanding, and is agreeable to this plan.\par \par

## 2020-03-03 NOTE — PROCEDURE
[FreeTextEntry1] : Incision and Drainage of Perirectal Abscess\par \par Pre-procedure Diagnosis: Perirectal Abscess\par Post-procedure Diagnosis: Perirectal Abscess\par Procedure: Incision and Drainage of Perirectal Abscess\par Anesthesia: 1% Lidocaine with epinephrine\par Estimated blood loss: minimal\par Specimen: none\par Drain: none\par Complications: none\par \par Indications: Patient presents with history of pain and swelling around anus. On physical exam, a possible perirectal abscess was detected. Risks/benefits/alternative were discussed and patient agreed to proceed with office based procedure.\par \par Procedure Details: Consent obtained. Patient was placed in a modified prone tanner-knife position on the examination table. Upon inspection, a perirectal abscess was suspected in the right posterolateral position. Betadine was used to clean the overlying skin. The overlying skin was injected with local anesthetic. A cruciate incision was made in the skin overlying the point of maximal fluctuance. A scant amount of sanguopurulent fluid was expressed from the underlying cavity. A clamp was used to break loculations in the cavity. The patient reported relief of symptoms. Hemostasis was achieved. Packing was inserted into the abscess cavity and the wound was dressed.  The patient tolerated the procedure well.\par \par Post-procedure instructions were reviewed with the patient, including local wound care. All questions answered.\par

## 2020-03-09 ENCOUNTER — APPOINTMENT (OUTPATIENT)
Dept: SURGERY | Facility: CLINIC | Age: 75
End: 2020-03-09

## 2020-03-10 ENCOUNTER — FORM ENCOUNTER (OUTPATIENT)
Age: 75
End: 2020-03-10

## 2020-03-11 ENCOUNTER — OUTPATIENT (OUTPATIENT)
Dept: OUTPATIENT SERVICES | Facility: HOSPITAL | Age: 75
LOS: 1 days | End: 2020-03-11

## 2020-03-11 ENCOUNTER — APPOINTMENT (OUTPATIENT)
Dept: MRI IMAGING | Facility: CLINIC | Age: 75
End: 2020-03-11
Payer: MEDICARE

## 2020-03-11 PROCEDURE — 72197 MRI PELVIS W/O & W/DYE: CPT | Mod: 26

## 2020-03-23 ENCOUNTER — APPOINTMENT (OUTPATIENT)
Dept: COLORECTAL SURGERY | Facility: CLINIC | Age: 75
End: 2020-03-23
Payer: MEDICARE

## 2020-03-23 VITALS
WEIGHT: 175 LBS | SYSTOLIC BLOOD PRESSURE: 174 MMHG | TEMPERATURE: 98 F | DIASTOLIC BLOOD PRESSURE: 81 MMHG | OXYGEN SATURATION: 94 % | HEIGHT: 64 IN | BODY MASS INDEX: 29.88 KG/M2 | HEART RATE: 79 BPM

## 2020-03-23 PROCEDURE — 99213 OFFICE O/P EST LOW 20 MIN: CPT

## 2020-03-23 NOTE — PHYSICAL EXAM
[FreeTextEntry1] : Medical assistant present for duration of physical examination\par \par Gen NAD, AOx3\par \par Anorectal Exam:\par Inspection no erythema induration or fluctuance, right posterior I&D site with external opening to fistula, no expressible drainage, right lateral perianal scarring, perianal hemorrhoidal tags\par \par \par

## 2020-03-23 NOTE — ASSESSMENT
[FreeTextEntry1] : Exam findings and diagnosis were discussed at length with patient.\par MRI findings discussed with patient\par Discussed consideration for seton placement to prevent recurrent abscess/sepsis, with consideration for definitive fistula procedure in future.\par \par The nature of the procedure as well as risks and benefits were reviewed with the patient. Risk/benefits/alternatives discussed at length, including but not limited to pain, bleeding, infection, recurrence of symptoms, need for future surgery, scarring and anal stenosis, and risk of alteration of bowel habits, such as seepage, fecal urgency and/or fecal (gas, liquid or solid) incontinence discussed, which may be temporary or permanent.\par \par Patient informed of [facility] policy regarding Coronavirus and is aware that elective surgical procedures are not being performed or scheduled at this time. Will contact surgical administration for consideration for scheduling. \par \par Signs and symptoms of perirectal abscess reviewed with patient. If occur, patient instructed to call our office for evaluation or present to nearest ED\par \par All questions were answered, patient expressed understanding, and is agreeable to this plan.\par

## 2020-03-23 NOTE — HISTORY OF PRESENT ILLNESS
[FreeTextEntry1] : 74 yo M presents for f/u abscess/anal fistula\par \par hx of admission September 2019 for sepsis and shock secondary to ischiorectal abscess, s/p urgent incision and drainage of abscess w/ Dr. Alegre and had subsequent stay in ICU with intubation and resolution of his sepsis\par \par Was well until 3/3/2020 when patient felt signs of an early abscess, office based I&D performed.\par No complaints since then\par Recovered well post procedure\par Completed course of antibiotics\par No fevers\par No drainage\par Moving bowels at baseline. Consistency fluctuates depending on dietary intake, had been on metamucil in past, not taking currently\par Reports fecal urgency and occasional full fecal incontinence to gas and liquids stool, does not occur if stool is formed\par \par Completed MRI as outpatient, returns today to discuss results and further management options\par \par "EXAM: MR PELVIS WAW IC \par PROCEDURE DATE: 03/11/2020\par \par FINDINGS:\par \par [...]\par \par BOWEL: \par Linear T2 bright enhancing tract arising from right posterior inferior intersphincteric space at 7:00 on \par 11:19 at 1-2 cm from the anal verge. It courses inferiorly to the skin opening in the right medial \par buttocks. No abscess.\par \par 0.8 cm superior to what 2.8 cm from the anal verge is an enhancing linear tract originating in the \par posterior midline intersphincteric space and coursing laterally through the external sphincter and \par ischioanal fossa just below the insertion of the puborectalis muscle. This is best seen on 12:45-49. No \par obvious opening to the skin. No abscess. \par Scarring in the right ischioanal fossa is noted. \par \par PERITONEUM: No ascites. \par VESSELS: Within normal limits. \par ABDOMINAL WALL: Within normal limits. \par BONES: Within normal limits. \par \par IMPRESSION: \par Two limbed posterior perianal fistula versus 2 separate fistula tracts with transsphincteric and \par intersphincteric components as described. No abscess."

## 2020-06-01 DIAGNOSIS — Z01.818 ENCOUNTER FOR OTHER PREPROCEDURAL EXAMINATION: ICD-10-CM

## 2020-06-03 ENCOUNTER — OUTPATIENT (OUTPATIENT)
Dept: OUTPATIENT SERVICES | Facility: HOSPITAL | Age: 75
LOS: 1 days | End: 2020-06-03
Payer: MEDICARE

## 2020-06-03 ENCOUNTER — LABORATORY RESULT (OUTPATIENT)
Age: 75
End: 2020-06-03

## 2020-06-03 DIAGNOSIS — Z01.818 ENCOUNTER FOR OTHER PREPROCEDURAL EXAMINATION: ICD-10-CM

## 2020-06-03 LAB
A1C WITH ESTIMATED AVERAGE GLUCOSE RESULT: 6.6 % — HIGH (ref 4–5.6)
ALBUMIN SERPL ELPH-MCNC: 4.4 G/DL — SIGNIFICANT CHANGE UP (ref 3.3–5)
ALP SERPL-CCNC: 52 U/L — SIGNIFICANT CHANGE UP (ref 40–120)
ALT FLD-CCNC: 33 U/L — SIGNIFICANT CHANGE UP (ref 10–45)
ANION GAP SERPL CALC-SCNC: 11 MMOL/L — SIGNIFICANT CHANGE UP (ref 5–17)
AST SERPL-CCNC: 23 U/L — SIGNIFICANT CHANGE UP (ref 10–40)
BASOPHILS # BLD AUTO: 0.03 K/UL — SIGNIFICANT CHANGE UP (ref 0–0.2)
BASOPHILS NFR BLD AUTO: 0.5 % — SIGNIFICANT CHANGE UP (ref 0–2)
BILIRUB SERPL-MCNC: 0.6 MG/DL — SIGNIFICANT CHANGE UP (ref 0.2–1.2)
BUN SERPL-MCNC: 14 MG/DL — SIGNIFICANT CHANGE UP (ref 7–23)
CALCIUM SERPL-MCNC: 9.6 MG/DL — SIGNIFICANT CHANGE UP (ref 8.4–10.5)
CHLORIDE SERPL-SCNC: 106 MMOL/L — SIGNIFICANT CHANGE UP (ref 96–108)
CO2 SERPL-SCNC: 27 MMOL/L — SIGNIFICANT CHANGE UP (ref 22–31)
CREAT SERPL-MCNC: 1.16 MG/DL — SIGNIFICANT CHANGE UP (ref 0.5–1.3)
EOSINOPHIL # BLD AUTO: 0.22 K/UL — SIGNIFICANT CHANGE UP (ref 0–0.5)
EOSINOPHIL NFR BLD AUTO: 3.7 % — SIGNIFICANT CHANGE UP (ref 0–6)
ESTIMATED AVERAGE GLUCOSE: 143 MG/DL — HIGH (ref 68–114)
GLUCOSE SERPL-MCNC: 134 MG/DL — HIGH (ref 70–99)
HCT VFR BLD CALC: 49.7 % — SIGNIFICANT CHANGE UP (ref 39–50)
HGB BLD-MCNC: 15.9 G/DL — SIGNIFICANT CHANGE UP (ref 13–17)
IMM GRANULOCYTES NFR BLD AUTO: 0.3 % — SIGNIFICANT CHANGE UP (ref 0–1.5)
LYMPHOCYTES # BLD AUTO: 2.29 K/UL — SIGNIFICANT CHANGE UP (ref 1–3.3)
LYMPHOCYTES # BLD AUTO: 38.6 % — SIGNIFICANT CHANGE UP (ref 13–44)
MCHC RBC-ENTMCNC: 28.9 PG — SIGNIFICANT CHANGE UP (ref 27–34)
MCHC RBC-ENTMCNC: 32 GM/DL — SIGNIFICANT CHANGE UP (ref 32–36)
MCV RBC AUTO: 90.4 FL — SIGNIFICANT CHANGE UP (ref 80–100)
MONOCYTES # BLD AUTO: 0.41 K/UL — SIGNIFICANT CHANGE UP (ref 0–0.9)
MONOCYTES NFR BLD AUTO: 6.9 % — SIGNIFICANT CHANGE UP (ref 2–14)
NEUTROPHILS # BLD AUTO: 2.96 K/UL — SIGNIFICANT CHANGE UP (ref 1.8–7.4)
NEUTROPHILS NFR BLD AUTO: 50 % — SIGNIFICANT CHANGE UP (ref 43–77)
NRBC # BLD: 0 /100 WBCS — SIGNIFICANT CHANGE UP (ref 0–0)
PLATELET # BLD AUTO: 207 K/UL — SIGNIFICANT CHANGE UP (ref 150–400)
POTASSIUM SERPL-MCNC: 4.9 MMOL/L — SIGNIFICANT CHANGE UP (ref 3.5–5.3)
POTASSIUM SERPL-SCNC: 4.9 MMOL/L — SIGNIFICANT CHANGE UP (ref 3.5–5.3)
PROT SERPL-MCNC: 6.3 G/DL — SIGNIFICANT CHANGE UP (ref 6–8.3)
RBC # BLD: 5.5 M/UL — SIGNIFICANT CHANGE UP (ref 4.2–5.8)
RBC # FLD: 12.5 % — SIGNIFICANT CHANGE UP (ref 10.3–14.5)
SODIUM SERPL-SCNC: 144 MMOL/L — SIGNIFICANT CHANGE UP (ref 135–145)
WBC # BLD: 5.93 K/UL — SIGNIFICANT CHANGE UP (ref 3.8–10.5)
WBC # FLD AUTO: 5.93 K/UL — SIGNIFICANT CHANGE UP (ref 3.8–10.5)

## 2020-06-03 PROCEDURE — 93005 ELECTROCARDIOGRAM TRACING: CPT

## 2020-06-03 PROCEDURE — 93010 ELECTROCARDIOGRAM REPORT: CPT

## 2020-06-03 PROCEDURE — 85025 COMPLETE CBC W/AUTO DIFF WBC: CPT

## 2020-06-03 PROCEDURE — 80053 COMPREHEN METABOLIC PANEL: CPT

## 2020-06-03 PROCEDURE — 83036 HEMOGLOBIN GLYCOSYLATED A1C: CPT

## 2020-06-04 ENCOUNTER — TRANSCRIPTION ENCOUNTER (OUTPATIENT)
Age: 75
End: 2020-06-04

## 2020-06-05 ENCOUNTER — OUTPATIENT (OUTPATIENT)
Dept: OUTPATIENT SERVICES | Facility: HOSPITAL | Age: 75
LOS: 1 days | Discharge: HOME | End: 2020-06-05
Payer: MEDICARE

## 2020-06-05 ENCOUNTER — APPOINTMENT (OUTPATIENT)
Dept: COLORECTAL SURGERY | Facility: CLINIC | Age: 75
End: 2020-06-05

## 2020-06-05 LAB — GLUCOSE BLDC GLUCOMTR-MCNC: 123 MG/DL — HIGH (ref 70–99)

## 2020-06-05 PROCEDURE — 46020 PLACEMENT OF SETON: CPT | Mod: GC

## 2020-06-18 ENCOUNTER — APPOINTMENT (OUTPATIENT)
Dept: COLORECTAL SURGERY | Facility: CLINIC | Age: 75
End: 2020-06-18
Payer: MEDICARE

## 2020-06-18 VITALS
TEMPERATURE: 98.5 F | SYSTOLIC BLOOD PRESSURE: 174 MMHG | BODY MASS INDEX: 32.1 KG/M2 | WEIGHT: 188 LBS | DIASTOLIC BLOOD PRESSURE: 82 MMHG | HEIGHT: 64 IN | HEART RATE: 98 BPM

## 2020-06-18 PROCEDURE — 99213 OFFICE O/P EST LOW 20 MIN: CPT

## 2020-06-18 RX ORDER — AMOXICILLIN AND CLAVULANATE POTASSIUM 875; 125 MG/1; MG/1
875-125 TABLET, COATED ORAL
Qty: 14 | Refills: 0 | Status: DISCONTINUED | COMMUNITY
Start: 2020-03-03 | End: 2020-06-18

## 2020-06-18 NOTE — HISTORY OF PRESENT ILLNESS
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 Reports that several days ago after sitting in a chair he began to experience pain and feels that the seton moved more internally. Reports pain had improved over the last couple of days, using Tylenol PRN\par Doing sitz baths several times per day\par Denies fever, chills, bleeding or itching. Reports occasional drainage after cleaning, scant amount noted at a time. \par BH: 2-3\par Denies constipation, diarrhea or straining. Denies FU/FI\par Reports adequate dietary fiber and water intake. Has not had to use stool softeners since procedure

## 2020-06-18 NOTE — PHYSICAL EXAM
[FreeTextEntry1] : Medical assistant present for duration of physical examination\par \par Gen NAD, AOx3\par Nonlabored breathing\par Ambulating without assistance\par Skin normal color and pigment, no visible lesions or rashes\par \par Abdomen\par \par Anorectal Exam:\par Inspection no erythema, induration or fluctuance, no skin excoriation, no fissure, right posterolateral external opening with seton in place, knots have rotated internally into prior abscess cavity, seton gently rotated to externalized the tied ends of the seton, seton and knots noted to be intact\par STEVE nontender, no masses palpated, no blood on gloved finger\par \par \par \par

## 2020-06-18 NOTE — ASSESSMENT
[FreeTextEntry1] : Exam findings and diagnosis were discussed at length with patient.\par Seton care reviewed with patient.\par Continue sitz baths.\par Surgical options discussed, recommend sphincter preserving procedure - rectal mucosal advancement flap, removal of seton. \par The nature of the procedure as well as risks and benefits were reviewed with the patient. Risks discussed included but were not limited to pain, bleeding, infection, mucosal ectroprion formation causing perianal moisture, recurrence of symptoms, need for future surgery, and risk of alteration of bowel habits, such as seepage, fecal urgency and/or fecal (gas, liquid or solid) incontinence discussed, which may be temporary or permanent. \par Failure rates of intervention discussed.\par The preoperative, intraoperative, and postoperative management were discussed with the patient in detail. All questions were answered. The patient is in agreement with the proposed surgery and we will proceed with surgical planning.\par \par (posterior midline internal opening - lithotomy position, GETA)

## 2020-07-08 ENCOUNTER — LABORATORY RESULT (OUTPATIENT)
Age: 75
End: 2020-07-08

## 2020-07-09 ENCOUNTER — TRANSCRIPTION ENCOUNTER (OUTPATIENT)
Age: 75
End: 2020-07-09

## 2020-07-10 ENCOUNTER — APPOINTMENT (OUTPATIENT)
Dept: COLORECTAL SURGERY | Facility: CLINIC | Age: 75
End: 2020-07-10

## 2020-07-10 ENCOUNTER — OUTPATIENT (OUTPATIENT)
Dept: OUTPATIENT SERVICES | Facility: HOSPITAL | Age: 75
LOS: 1 days | Discharge: HOME | End: 2020-07-10
Payer: MEDICARE

## 2020-07-10 ENCOUNTER — RESULT REVIEW (OUTPATIENT)
Age: 75
End: 2020-07-10

## 2020-07-10 LAB — GLUCOSE BLDC GLUCOMTR-MCNC: 116 MG/DL — HIGH (ref 70–99)

## 2020-07-10 PROCEDURE — 46288 REPAIR ANAL FISTULA: CPT | Mod: GC

## 2020-07-10 PROCEDURE — 88304 TISSUE EXAM BY PATHOLOGIST: CPT | Mod: 26

## 2020-07-10 RX ORDER — DOCUSATE SODIUM 100 MG
1 CAPSULE ORAL
Qty: 60 | Refills: 0
Start: 2020-07-10 | End: 2020-08-08

## 2020-07-13 LAB — SURGICAL PATHOLOGY STUDY: SIGNIFICANT CHANGE UP

## 2020-08-10 ENCOUNTER — APPOINTMENT (OUTPATIENT)
Dept: COLORECTAL SURGERY | Facility: CLINIC | Age: 75
End: 2020-08-10
Payer: MEDICARE

## 2020-08-10 VITALS
HEIGHT: 64 IN | TEMPERATURE: 98 F | BODY MASS INDEX: 32.1 KG/M2 | HEART RATE: 79 BPM | OXYGEN SATURATION: 96 % | DIASTOLIC BLOOD PRESSURE: 77 MMHG | WEIGHT: 188 LBS | SYSTOLIC BLOOD PRESSURE: 175 MMHG

## 2020-08-10 DIAGNOSIS — K61.39 OTHER ISCHIORECTAL ABSCESS: ICD-10-CM

## 2020-08-10 DIAGNOSIS — E78.00 PURE HYPERCHOLESTEROLEMIA, UNSPECIFIED: ICD-10-CM

## 2020-08-10 DIAGNOSIS — L03.011 CELLULITIS OF RIGHT FINGER: ICD-10-CM

## 2020-08-10 DIAGNOSIS — L08.9 LOCAL INFECTION OF THE SKIN AND SUBCUTANEOUS TISSUE, UNSPECIFIED: ICD-10-CM

## 2020-08-10 DIAGNOSIS — R73.03 PREDIABETES.: ICD-10-CM

## 2020-08-10 DIAGNOSIS — I10 ESSENTIAL (PRIMARY) HYPERTENSION: ICD-10-CM

## 2020-08-10 DIAGNOSIS — K46.9 UNSPECIFIED ABDOMINAL HERNIA W/OUT OBSTRUCTION OR GANGRENE: ICD-10-CM

## 2020-08-10 DIAGNOSIS — Z00.00 ENCOUNTER FOR GENERAL ADULT MEDICAL EXAMINATION W/OUT ABNORMAL FINDINGS: ICD-10-CM

## 2020-08-10 PROCEDURE — 99024 POSTOP FOLLOW-UP VISIT: CPT

## 2020-08-10 PROCEDURE — 46600 DIAGNOSTIC ANOSCOPY SPX: CPT | Mod: 79

## 2020-08-10 NOTE — PHYSICAL EXAM
[FreeTextEntry1] : Medical assistant present for duration of physical examination\par \par Gen no acute distress, alert and oriented\par Psych calm, pleasant demeanor, responding appropriately to question\par Nonlabored breathing\par Ambulating without assistance\par Skin normal color and pigment, no visible lesions or rashes\par \par Anorectal Exam:\par Inspection no erythema, induration or fluctuance, mild skin excoriation in intergluteal cleft near coccyx, no fissure, minimal external hemorrhoids nonthrombosed, right posterior quadrant external wound with granulation tissue, no fibrinous exudate\par STEVE nontender, no masses palpated, no blood on gloved finger\par \par Procedure: Anoscopy\par \par 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 Anoscopy was performed by passing a lighted anoscope with lubricant jelly into the anal canal and the entire anal mucosal surface was inspected. Findings included mucosa of flap pink, vicryl stitches in place, no intraanal discharge \par \par Patient tolerated examination and procedure well.\par \par \par

## 2020-08-10 NOTE — HISTORY OF PRESENT ILLNESS
[FreeTextEntry1] : 74yo male presents for postoperative visit\par \par s/p rectal advancement flap 7/10/20\par \par Surgical Final Report\par \par Final Diagnosis\par \par Fistula tract, excision:\par - Fibrous tissue and anal mucosa with fibrosis, marked acute\par inflammation, focal giant cell reaction and granulation tissue\par formation,\par consistent with fistula\par \par Verified by: Vinh Grimes M.D.\par (Electronic Signature)\par Reported on: 07/13/20 16:11 EDT, [facility], [address] [address]\par Phone: [phone]   Fax: [phone]\par \par Patient experience pain and perianal swelling for 1-2 weeks initially postoperatively\par BMs were painful\par Patient used percocet for 1-2 days, made him constipated so took colace. Patient did not want to be taking mutliple medications so he stopped both medications and started using OTC pain meds and prepH topically\par Bowel movements returned to baseline, taking metamucil \par Sitz baths BID\par Denies blood\par Notes drainage from external wound\par Denies fever

## 2020-08-10 NOTE — ASSESSMENT
[FreeTextEntry1] : Recovering well, patient reassured\par Continue high fiber diet, adequate oral hydration, stool softeners and sitz baths as needed\par Avoid constipation and straining\par Over the counter pain control prn\par F/u in 1 month or sooner as needed\par All questions were answered, patient expressed understanding, and is agreeable to this plan.\par

## 2020-09-14 ENCOUNTER — APPOINTMENT (OUTPATIENT)
Dept: COLORECTAL SURGERY | Facility: CLINIC | Age: 75
End: 2020-09-14
Payer: MEDICARE

## 2020-09-14 VITALS
BODY MASS INDEX: 32.1 KG/M2 | OXYGEN SATURATION: 94 % | WEIGHT: 188 LBS | HEART RATE: 83 BPM | SYSTOLIC BLOOD PRESSURE: 161 MMHG | DIASTOLIC BLOOD PRESSURE: 90 MMHG | HEIGHT: 64 IN | TEMPERATURE: 98 F

## 2020-09-14 DIAGNOSIS — K60.3 ANAL FISTULA: ICD-10-CM

## 2020-09-14 PROCEDURE — 99024 POSTOP FOLLOW-UP VISIT: CPT

## 2020-09-14 PROCEDURE — 46600 DIAGNOSTIC ANOSCOPY SPX: CPT | Mod: 79

## 2020-09-14 NOTE — HISTORY OF PRESENT ILLNESS
[FreeTextEntry1] : 76yo male presents for follow up s/p anal fistula repair\par s/p rectal advancement flap 7/10/20\par Last seen 8/10/20\par \par Doing well\par Denies pain\par Denies blood\par Denies discharge or drainage\par Denies fever\par \par Reports perianal itchiness, using prep H topically with improvement of symptoms\par Reports scratching\par (+) caffeine intake\par \par Remains on metamucil\par Moving bowels regularly - baseline is 3-4 formed BMs per day\par Denies fecal urgency or incontinence

## 2020-09-14 NOTE — PHYSICAL EXAM
[FreeTextEntry1] : Medical assistant present for duration of physical examination\par \par Gen no acute distress, alert and oriented\par Psych calm, pleasant demeanor, responding appropriately to question\par Nonlabored breathing\par Ambulating without assistance\par Skin normal color and pigment, no visible lesions or rashes\par \par Anorectal Exam:\par Inspection no erythema, induration or fluctuance, mild pruritis in intergluteal cleft, no fissure, soft external hemorrhoids nonthrombosed right posterior anal verge, right posterior quadrant external wound well healed\par STEVE nontender, no masses palpated, no blood on gloved finger\par \par Procedure: Anoscopy\par \par 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 Anoscopy was performed by passing a lighted anoscope with lubricant jelly into the anal canal and the entire anal mucosal surface was inspected. Findings included well healed and incorporated advancement flap, mucosa pink, no intraanal discharge, no visible sutures\par \par Patient tolerated examination and procedure well.\par \par \par

## 2020-09-14 NOTE — ASSESSMENT
[FreeTextEntry1] : Recovered well, patient reassured\par Continue high fiber diet, adequate oral hydration, stool softeners and sitz baths as needed\par Recommend avoid scratching and decrease caffeine intake. Role of topical barrier agents discussed.\par Signs/symptoms of abscess reviewed with patient. Advised to call office for evaluation or go to nearest ED if recur\par F/u as needed if symptoms return or if new symptoms arise\par All questions were answered, patient expressed understanding, and is agreeable to this plan.\par

## 2022-03-31 NOTE — CONSULT NOTE ADULT - CONSULT REQUESTED BY NAME
Codey
Dr. Alex
Dr. Gaines
You can access the FollowMyHealth Patient Portal offered by [facility] by registering at the following website: [email or web address]. By joining [employer]’s FollowMyHealth portal, you will also be able to view your health information using other applications (apps) compatible with our system.

## 2022-04-24 ENCOUNTER — EMERGENCY (EMERGENCY)
Facility: HOSPITAL | Age: 77
LOS: 1 days | Discharge: HOME | End: 2022-04-24
Attending: EMERGENCY MEDICINE | Admitting: EMERGENCY MEDICINE
Payer: MEDICARE

## 2022-04-24 VITALS
OXYGEN SATURATION: 97 % | DIASTOLIC BLOOD PRESSURE: 84 MMHG | SYSTOLIC BLOOD PRESSURE: 168 MMHG | RESPIRATION RATE: 18 BRPM | TEMPERATURE: 98 F | HEART RATE: 68 BPM

## 2022-04-24 VITALS
HEIGHT: 64 IN | RESPIRATION RATE: 16 BRPM | DIASTOLIC BLOOD PRESSURE: 104 MMHG | WEIGHT: 182.1 LBS | SYSTOLIC BLOOD PRESSURE: 200 MMHG | OXYGEN SATURATION: 97 % | HEART RATE: 80 BPM | TEMPERATURE: 97 F

## 2022-04-24 LAB
ALBUMIN SERPL ELPH-MCNC: 3.7 G/DL — SIGNIFICANT CHANGE UP (ref 3.4–5)
ALP SERPL-CCNC: 49 U/L — SIGNIFICANT CHANGE UP (ref 40–120)
ALT FLD-CCNC: 41 U/L — SIGNIFICANT CHANGE UP (ref 12–42)
ANION GAP SERPL CALC-SCNC: 6 MMOL/L — LOW (ref 9–16)
APPEARANCE UR: CLEAR — SIGNIFICANT CHANGE UP
AST SERPL-CCNC: 23 U/L — SIGNIFICANT CHANGE UP (ref 15–37)
BASOPHILS # BLD AUTO: 0.04 K/UL — SIGNIFICANT CHANGE UP (ref 0–0.2)
BASOPHILS NFR BLD AUTO: 0.6 % — SIGNIFICANT CHANGE UP (ref 0–2)
BILIRUB SERPL-MCNC: 0.8 MG/DL — SIGNIFICANT CHANGE UP (ref 0.2–1.2)
BILIRUB UR-MCNC: NEGATIVE — SIGNIFICANT CHANGE UP
BUN SERPL-MCNC: 18 MG/DL — SIGNIFICANT CHANGE UP (ref 7–23)
CALCIUM SERPL-MCNC: 8.6 MG/DL — SIGNIFICANT CHANGE UP (ref 8.5–10.5)
CHLORIDE SERPL-SCNC: 104 MMOL/L — SIGNIFICANT CHANGE UP (ref 96–108)
CO2 SERPL-SCNC: 28 MMOL/L — SIGNIFICANT CHANGE UP (ref 22–31)
COLOR SPEC: YELLOW — SIGNIFICANT CHANGE UP
CREAT SERPL-MCNC: 1.29 MG/DL — SIGNIFICANT CHANGE UP (ref 0.5–1.3)
DIFF PNL FLD: NEGATIVE — SIGNIFICANT CHANGE UP
EGFR: 57 ML/MIN/1.73M2 — LOW
EOSINOPHIL # BLD AUTO: 0.16 K/UL — SIGNIFICANT CHANGE UP (ref 0–0.5)
EOSINOPHIL NFR BLD AUTO: 2.3 % — SIGNIFICANT CHANGE UP (ref 0–6)
GLUCOSE SERPL-MCNC: 192 MG/DL — HIGH (ref 70–99)
GLUCOSE UR QL: NEGATIVE — SIGNIFICANT CHANGE UP
HCT VFR BLD CALC: 48.6 % — SIGNIFICANT CHANGE UP (ref 39–50)
HGB BLD-MCNC: 16.1 G/DL — SIGNIFICANT CHANGE UP (ref 13–17)
IMM GRANULOCYTES NFR BLD AUTO: 0.1 % — SIGNIFICANT CHANGE UP (ref 0–1.5)
KETONES UR-MCNC: NEGATIVE — SIGNIFICANT CHANGE UP
LEUKOCYTE ESTERASE UR-ACNC: NEGATIVE — SIGNIFICANT CHANGE UP
LYMPHOCYTES # BLD AUTO: 1.77 K/UL — SIGNIFICANT CHANGE UP (ref 1–3.3)
LYMPHOCYTES # BLD AUTO: 25.7 % — SIGNIFICANT CHANGE UP (ref 13–44)
MCHC RBC-ENTMCNC: 29.1 PG — SIGNIFICANT CHANGE UP (ref 27–34)
MCHC RBC-ENTMCNC: 33.1 GM/DL — SIGNIFICANT CHANGE UP (ref 32–36)
MCV RBC AUTO: 87.9 FL — SIGNIFICANT CHANGE UP (ref 80–100)
MONOCYTES # BLD AUTO: 0.4 K/UL — SIGNIFICANT CHANGE UP (ref 0–0.9)
MONOCYTES NFR BLD AUTO: 5.8 % — SIGNIFICANT CHANGE UP (ref 2–14)
NEUTROPHILS # BLD AUTO: 4.52 K/UL — SIGNIFICANT CHANGE UP (ref 1.8–7.4)
NEUTROPHILS NFR BLD AUTO: 65.5 % — SIGNIFICANT CHANGE UP (ref 43–77)
NITRITE UR-MCNC: NEGATIVE — SIGNIFICANT CHANGE UP
NRBC # BLD: 0 /100 WBCS — SIGNIFICANT CHANGE UP (ref 0–0)
PH UR: 6 — SIGNIFICANT CHANGE UP (ref 5–8)
PLATELET # BLD AUTO: 196 K/UL — SIGNIFICANT CHANGE UP (ref 150–400)
POTASSIUM SERPL-MCNC: 4.2 MMOL/L — SIGNIFICANT CHANGE UP (ref 3.5–5.3)
POTASSIUM SERPL-SCNC: 4.2 MMOL/L — SIGNIFICANT CHANGE UP (ref 3.5–5.3)
PROT SERPL-MCNC: 6.3 G/DL — LOW (ref 6.4–8.2)
PROT UR-MCNC: NEGATIVE MG/DL — SIGNIFICANT CHANGE UP
RBC # BLD: 5.53 M/UL — SIGNIFICANT CHANGE UP (ref 4.2–5.8)
RBC # FLD: 12.8 % — SIGNIFICANT CHANGE UP (ref 10.3–14.5)
SODIUM SERPL-SCNC: 138 MMOL/L — SIGNIFICANT CHANGE UP (ref 132–145)
SP GR SPEC: 1.01 — SIGNIFICANT CHANGE UP (ref 1–1.03)
UROBILINOGEN FLD QL: 0.2 E.U./DL — SIGNIFICANT CHANGE UP
WBC # BLD: 6.9 K/UL — SIGNIFICANT CHANGE UP (ref 3.8–10.5)
WBC # FLD AUTO: 6.9 K/UL — SIGNIFICANT CHANGE UP (ref 3.8–10.5)

## 2022-04-24 PROCEDURE — 99285 EMERGENCY DEPT VISIT HI MDM: CPT

## 2022-04-24 PROCEDURE — 74174 CTA ABD&PLVS W/CONTRAST: CPT | Mod: 26

## 2022-04-24 PROCEDURE — 71275 CT ANGIOGRAPHY CHEST: CPT | Mod: 26

## 2022-04-24 RX ORDER — IBUPROFEN 200 MG
1 TABLET ORAL
Qty: 28 | Refills: 0
Start: 2022-04-24 | End: 2022-04-30

## 2022-04-24 RX ORDER — TRAMADOL HYDROCHLORIDE 50 MG/1
0.5 TABLET ORAL
Qty: 6 | Refills: 0
Start: 2022-04-24 | End: 2022-04-26

## 2022-04-24 RX ORDER — MORPHINE SULFATE 50 MG/1
4 CAPSULE, EXTENDED RELEASE ORAL ONCE
Refills: 0 | Status: DISCONTINUED | OUTPATIENT
Start: 2022-04-24 | End: 2022-04-24

## 2022-04-24 RX ORDER — TRAMADOL HYDROCHLORIDE 50 MG/1
25 TABLET ORAL ONCE
Refills: 0 | Status: DISCONTINUED | OUTPATIENT
Start: 2022-04-24 | End: 2022-04-24

## 2022-04-24 RX ADMIN — MORPHINE SULFATE 4 MILLIGRAM(S): 50 CAPSULE, EXTENDED RELEASE ORAL at 12:14

## 2022-04-24 RX ADMIN — TRAMADOL HYDROCHLORIDE 25 MILLIGRAM(S): 50 TABLET ORAL at 16:54

## 2022-04-24 NOTE — ED ADULT NURSE NOTE - OBJECTIVE STATEMENT
Pt came in c/o of right flank pain x today. PMH of htn, high cholesterol reports forgetting to take htn meds today. Denies dysuria, fever, chills, sob, cp, n/v/d. A&Ox3 speaking in full sentences

## 2022-04-24 NOTE — ED PROVIDER NOTE - CARE PROVIDER_API CALL
Joseph Hankins)  [facility]  [address] [place] [address]  Phone: [phone]  Fax: [phone]  Follow Up Time:

## 2022-04-24 NOTE — ED PROVIDER NOTE - CLINICAL SUMMARY MEDICAL DECISION MAKING FREE TEXT BOX
back pain, CT negative, labs OK, UA negative, stable for dc home, will rx PO meds for back pain recc outpatient followup.

## 2022-04-24 NOTE — ED PROVIDER NOTE - OBJECTIVE STATEMENT
78 yo male hx HTN HLD DM with R flank pain described as gradual onset dull aching R flank, constant since yesterday, significantly improved with PO tylenol today. No fall/trauma, no known trigger for pain.

## 2022-04-24 NOTE — ED PROVIDER NOTE - PATIENT PORTAL LINK FT
You can access the FollowMyHealth Patient Portal offered by Health system by registering at the following website: [email or web address]. By joining [employer]’s FollowMyHealth portal, you will also be able to view your health information using other applications (apps) compatible with our system.

## 2022-04-24 NOTE — ED PROVIDER NOTE - NSFOLLOWUPINSTRUCTIONS_ED_ALL_ED_FT
Back Pain    Back pain is very common in adults. The cause of back pain is rarely dangerous and the pain often gets better over time. The cause of your back pain may not be known and may include strain of muscles or ligaments, degeneration of the spinal disks, or arthritis. Occasionally the pain may radiate down your leg(s). Over-the-counter medicines to reduce pain and inflammation are often the most helpful. Stretching and remaining active frequently helps the healing process.     SEEK IMMEDIATE MEDICAL CARE IF YOU HAVE ANY OF THE FOLLOWING SYMPTOMS: bowel or bladder control problems, unusual weakness or numbness in your arms or legs, nausea or vomiting, abdominal pain, fever, dizziness/lightheadedness.      1. IBUPROFEN 600MG EVERY 6 HOURS AS NEEDED FOR PAIN   2. TYLENOL (ACETAMINOPHEN) 650MG EVERY 6 HOURS AS NEEDED FOR PAIN   3. Over the counter SALONPAS SPRAY or LIDOCAINE patches  4. Get a massage (or two)  5. Try hot compresses, showers and baths  6. In 1-2 weeks, start back pain stretches from [employer]  7. In 1-2 months, start back pain strengthening exercises from [employer] Or see your regular doctor to get a referral for PHYSICAL THERAPY  8. If pain persists for 6 weeks, see your doctor for x-rays  9. IF  you get fevers or neurologic deficits - decreased sensation, weakness, tingling in your arms or legs OR  trouble urinating, weight loss or night sweats, then RETURN to the ER or see your doctor ASAP

## 2022-04-25 DIAGNOSIS — E78.5 HYPERLIPIDEMIA, UNSPECIFIED: ICD-10-CM

## 2022-04-25 DIAGNOSIS — I10 ESSENTIAL (PRIMARY) HYPERTENSION: ICD-10-CM

## 2022-04-25 DIAGNOSIS — R10.9 UNSPECIFIED ABDOMINAL PAIN: ICD-10-CM

## 2022-04-25 DIAGNOSIS — M54.9 DORSALGIA, UNSPECIFIED: ICD-10-CM

## 2022-04-25 DIAGNOSIS — E11.9 TYPE 2 DIABETES MELLITUS WITHOUT COMPLICATIONS: ICD-10-CM

## 2022-09-16 ENCOUNTER — APPOINTMENT (OUTPATIENT)
Dept: MRI IMAGING | Facility: CLINIC | Age: 77
End: 2022-09-16

## 2022-09-16 ENCOUNTER — OUTPATIENT (OUTPATIENT)
Dept: OUTPATIENT SERVICES | Facility: HOSPITAL | Age: 77
LOS: 1 days | End: 2022-09-16

## 2022-09-16 PROCEDURE — 72148 MRI LUMBAR SPINE W/O DYE: CPT | Mod: 26

## 2022-12-08 NOTE — PHYSICAL THERAPY INITIAL EVALUATION ADULT - PHYSICAL ASSIST/NONPHYSICAL ASSIST: SIT/SUPINE, REHAB EVAL
good eccentric lowering onto (R) shoulder; able to bring B/L LEs onto bed surface with VCs/verbal cues no

## 2023-05-22 NOTE — ASSESSMENT
[FreeTextEntry1] : 74 Year old male. Recovered from sepsis secondary to ischiorectal abscess requiring ICU stay, now s/p incision and drainage. Possible fistula. I have referred him to Dr. Curtis for further work up and management. I have answered all questions.  Consent Type: Consent 1 (Standard)